# Patient Record
Sex: FEMALE | Race: ASIAN | NOT HISPANIC OR LATINO | URBAN - METROPOLITAN AREA
[De-identification: names, ages, dates, MRNs, and addresses within clinical notes are randomized per-mention and may not be internally consistent; named-entity substitution may affect disease eponyms.]

---

## 2017-11-30 ENCOUNTER — EMERGENCY (EMERGENCY)
Facility: HOSPITAL | Age: 52
LOS: 1 days | Discharge: HOME | End: 2017-11-30
Attending: EMERGENCY MEDICINE | Admitting: EMERGENCY MEDICINE
Payer: MEDICAID

## 2017-11-30 VITALS
HEIGHT: 60 IN | TEMPERATURE: 99 F | HEART RATE: 75 BPM | OXYGEN SATURATION: 97 % | DIASTOLIC BLOOD PRESSURE: 83 MMHG | SYSTOLIC BLOOD PRESSURE: 123 MMHG | WEIGHT: 171.96 LBS | RESPIRATION RATE: 71 BRPM

## 2017-11-30 VITALS
DIASTOLIC BLOOD PRESSURE: 88 MMHG | SYSTOLIC BLOOD PRESSURE: 139 MMHG | OXYGEN SATURATION: 97 % | RESPIRATION RATE: 18 BRPM | HEART RATE: 71 BPM | TEMPERATURE: 99 F

## 2017-11-30 LAB
ALBUMIN SERPL ELPH-MCNC: 4.8 G/DL — SIGNIFICANT CHANGE UP (ref 3.3–5)
ALP SERPL-CCNC: 65 U/L — SIGNIFICANT CHANGE UP (ref 40–120)
ALT FLD-CCNC: 29 U/L RC — SIGNIFICANT CHANGE UP (ref 10–45)
ANION GAP SERPL CALC-SCNC: 15 MMOL/L — SIGNIFICANT CHANGE UP (ref 5–17)
AST SERPL-CCNC: 26 U/L — SIGNIFICANT CHANGE UP (ref 10–40)
BASOPHILS # BLD AUTO: 0.1 K/UL — SIGNIFICANT CHANGE UP (ref 0–0.2)
BASOPHILS NFR BLD AUTO: 0.8 % — SIGNIFICANT CHANGE UP (ref 0–2)
BILIRUB SERPL-MCNC: 0.5 MG/DL — SIGNIFICANT CHANGE UP (ref 0.2–1.2)
BUN SERPL-MCNC: 12 MG/DL — SIGNIFICANT CHANGE UP (ref 7–23)
CALCIUM SERPL-MCNC: 9.7 MG/DL — SIGNIFICANT CHANGE UP (ref 8.4–10.5)
CHLORIDE SERPL-SCNC: 100 MMOL/L — SIGNIFICANT CHANGE UP (ref 96–108)
CO2 SERPL-SCNC: 27 MMOL/L — SIGNIFICANT CHANGE UP (ref 22–31)
CREAT SERPL-MCNC: 0.79 MG/DL — SIGNIFICANT CHANGE UP (ref 0.5–1.3)
EOSINOPHIL # BLD AUTO: 0.3 K/UL — SIGNIFICANT CHANGE UP (ref 0–0.5)
EOSINOPHIL NFR BLD AUTO: 5.4 % — SIGNIFICANT CHANGE UP (ref 0–6)
GAS PNL BLDV: SIGNIFICANT CHANGE UP
GLUCOSE SERPL-MCNC: 106 MG/DL — HIGH (ref 70–99)
HCT VFR BLD CALC: 39.9 % — SIGNIFICANT CHANGE UP (ref 34.5–45)
HGB BLD-MCNC: 13.2 G/DL — SIGNIFICANT CHANGE UP (ref 11.5–15.5)
LYMPHOCYTES # BLD AUTO: 2.1 K/UL — SIGNIFICANT CHANGE UP (ref 1–3.3)
LYMPHOCYTES # BLD AUTO: 34.8 % — SIGNIFICANT CHANGE UP (ref 13–44)
MCHC RBC-ENTMCNC: 29.8 PG — SIGNIFICANT CHANGE UP (ref 27–34)
MCHC RBC-ENTMCNC: 33.2 GM/DL — SIGNIFICANT CHANGE UP (ref 32–36)
MCV RBC AUTO: 89.7 FL — SIGNIFICANT CHANGE UP (ref 80–100)
MONOCYTES # BLD AUTO: 0.6 K/UL — SIGNIFICANT CHANGE UP (ref 0–0.9)
MONOCYTES NFR BLD AUTO: 10 % — SIGNIFICANT CHANGE UP (ref 2–14)
NEUTROPHILS # BLD AUTO: 3 K/UL — SIGNIFICANT CHANGE UP (ref 1.8–7.4)
NEUTROPHILS NFR BLD AUTO: 48.9 % — SIGNIFICANT CHANGE UP (ref 43–77)
PLATELET # BLD AUTO: 248 K/UL — SIGNIFICANT CHANGE UP (ref 150–400)
POTASSIUM SERPL-MCNC: 4 MMOL/L — SIGNIFICANT CHANGE UP (ref 3.5–5.3)
POTASSIUM SERPL-SCNC: 4 MMOL/L — SIGNIFICANT CHANGE UP (ref 3.5–5.3)
PROT SERPL-MCNC: 8.4 G/DL — HIGH (ref 6–8.3)
RBC # BLD: 4.45 M/UL — SIGNIFICANT CHANGE UP (ref 3.8–5.2)
RBC # FLD: 12 % — SIGNIFICANT CHANGE UP (ref 10.3–14.5)
SODIUM SERPL-SCNC: 142 MMOL/L — SIGNIFICANT CHANGE UP (ref 135–145)
WBC # BLD: 6.1 K/UL — SIGNIFICANT CHANGE UP (ref 3.8–10.5)
WBC # FLD AUTO: 6.1 K/UL — SIGNIFICANT CHANGE UP (ref 3.8–10.5)

## 2017-11-30 PROCEDURE — 82435 ASSAY OF BLOOD CHLORIDE: CPT

## 2017-11-30 PROCEDURE — 85027 COMPLETE CBC AUTOMATED: CPT

## 2017-11-30 PROCEDURE — 99284 EMERGENCY DEPT VISIT MOD MDM: CPT | Mod: 25

## 2017-11-30 PROCEDURE — 70491 CT SOFT TISSUE NECK W/DYE: CPT | Mod: 26

## 2017-11-30 PROCEDURE — 82330 ASSAY OF CALCIUM: CPT

## 2017-11-30 PROCEDURE — 80053 COMPREHEN METABOLIC PANEL: CPT

## 2017-11-30 PROCEDURE — 99285 EMERGENCY DEPT VISIT HI MDM: CPT

## 2017-11-30 PROCEDURE — 82947 ASSAY GLUCOSE BLOOD QUANT: CPT

## 2017-11-30 PROCEDURE — 84295 ASSAY OF SERUM SODIUM: CPT

## 2017-11-30 PROCEDURE — 84132 ASSAY OF SERUM POTASSIUM: CPT

## 2017-11-30 PROCEDURE — 85014 HEMATOCRIT: CPT

## 2017-11-30 PROCEDURE — 82803 BLOOD GASES ANY COMBINATION: CPT

## 2017-11-30 PROCEDURE — 70491 CT SOFT TISSUE NECK W/DYE: CPT

## 2017-11-30 PROCEDURE — 96374 THER/PROPH/DIAG INJ IV PUSH: CPT | Mod: XU

## 2017-11-30 PROCEDURE — 83605 ASSAY OF LACTIC ACID: CPT

## 2017-11-30 PROCEDURE — 96375 TX/PRO/DX INJ NEW DRUG ADDON: CPT | Mod: XU

## 2017-11-30 RX ORDER — MORPHINE SULFATE 50 MG/1
4 CAPSULE, EXTENDED RELEASE ORAL ONCE
Qty: 0 | Refills: 0 | Status: DISCONTINUED | OUTPATIENT
Start: 2017-11-30 | End: 2017-11-30

## 2017-11-30 RX ORDER — ACETAMINOPHEN 500 MG
1000 TABLET ORAL ONCE
Qty: 0 | Refills: 0 | Status: COMPLETED | OUTPATIENT
Start: 2017-11-30 | End: 2017-11-30

## 2017-11-30 RX ORDER — SODIUM CHLORIDE 9 MG/ML
1000 INJECTION INTRAMUSCULAR; INTRAVENOUS; SUBCUTANEOUS ONCE
Qty: 0 | Refills: 0 | Status: COMPLETED | OUTPATIENT
Start: 2017-11-30 | End: 2017-11-30

## 2017-11-30 RX ORDER — OXYCODONE HYDROCHLORIDE 5 MG/1
5 TABLET ORAL ONCE
Qty: 0 | Refills: 0 | Status: DISCONTINUED | OUTPATIENT
Start: 2017-11-30 | End: 2017-11-30

## 2017-11-30 RX ADMIN — OXYCODONE HYDROCHLORIDE 5 MILLIGRAM(S): 5 TABLET ORAL at 21:51

## 2017-11-30 RX ADMIN — SODIUM CHLORIDE 1000 MILLILITER(S): 9 INJECTION INTRAMUSCULAR; INTRAVENOUS; SUBCUTANEOUS at 15:52

## 2017-11-30 RX ADMIN — Medication 400 MILLIGRAM(S): at 16:05

## 2017-11-30 RX ADMIN — MORPHINE SULFATE 4 MILLIGRAM(S): 50 CAPSULE, EXTENDED RELEASE ORAL at 17:02

## 2017-11-30 RX ADMIN — MORPHINE SULFATE 4 MILLIGRAM(S): 50 CAPSULE, EXTENDED RELEASE ORAL at 15:52

## 2017-11-30 RX ADMIN — Medication 100 MILLIGRAM(S): at 18:07

## 2017-11-30 RX ADMIN — Medication 1000 MILLIGRAM(S): at 17:02

## 2017-11-30 NOTE — ED PROVIDER NOTE - PROGRESS NOTE DETAILS
Spoke with radiology resident, patient has radioopaque opacity in left mandible, likely retained foreign body vs fracture of medial ledge alveolar process Dr. Shaikh Note: s/o from Dr. Quiñonez pending ct scan which shows possible FB vs fx, dentistry consulted, will dispo based on dentistry findings. Dr. Shaikh Note: pt seen by oral surgeon and dentistry, no infection, no antibx needed, has likely fx with retained bone, recommends outpt with pt's surgeon for re-eval and pain control.  Stable for dc with pain meds.

## 2017-11-30 NOTE — PROGRESS NOTE ADULT - SUBJECTIVE AND OBJECTIVE BOX
Patient is a 52y old female who presents with a chief complaint of tongue pain LL    HPI: pt had lower left third molar surgically extracted two days ago. Pt reports that immediately after anesthesia wore off, she began experiencing severe pain on left lateral tongue. Pt also complaining of soreness and swelling LL posterior to angle of mandible, and cannot open her mouth as normal. Pt was given amoxicillin and Tylenol 3 by oral surgeon, pt currently adherent to Rx regimen. Pt denies fever or difficulty swallowing.     PAST MEDICAL & SURGICAL HISTORY:  Asthma  High cholesterol  HTN (hypertension)  No significant past surgical history    MEDICATIONS  (STANDING):    MEDICATIONS  (PRN):   Tyelnol 3  Amoxicillin    Allergies: Motrin, NSAID's (hives)    *SOCIAL HISTORY: pt presented with daughter    *Last Dental Visit: two days ago    Vital Signs Last 24 Hrs  T(C): 37.2 (30 Nov 2017 21:55), Max: 37.4 (30 Nov 2017 15:19)  T(F): 99 (30 Nov 2017 21:55), Max: 99.4 (30 Nov 2017 15:19)  HR: 71 (30 Nov 2017 21:55) (71 - 75)  BP: 139/88 (30 Nov 2017 21:55) (123/83 - 153/92)  BP(mean): --  RR: 18 (30 Nov 2017 21:55) (18 - 71)  SpO2: 97% (30 Nov 2017 21:55) (97% - 99%)    EOE:  TMJ  ( -  ) clicks                    ( -   ) pops                    ( -   ) crepitus             ( -  ) trismus             ( + ) guarded opening. Pt can open 20 mm with pain, and can be extended to 30 mm with digital manipulation (consistent with recent third molar extraction)             ( -  ) LAD             ( -  ) mild swelling LL near angle of mandible, consistent with recent third molar extraction             ( +  ) asymmetry             ( +  ) palpation            IOE:   permanent/dentition: grossly intact           hard/soft palate:  ( -  ) palatal torus           tongue/FOM: 5x3 mm elliptical white apthous ulcer on left lateral border of tongue. Pain to palpation.           labial/buccal mucosa: WNL           no clinical signs of acute infection at extraction site #17. One silk suture observed.     LABS:                        13.2   6.1   )-----------( 248      ( 30 Nov 2017 15:59 )             39.9     11-30    142  |  100  |  12  ----------------------------<  106<H>  4.0   |  27  |  0.79    Ca    9.7      30 Nov 2017 15:59    TPro  8.4<H>  /  Alb  4.8  /  TBili  0.5  /  DBili  x   /  AST  26  /  ALT  29  /  AlkPhos  65  11-30    WBC Count: 6.1 K/uL [3.8 - 10.5] (11-30 @ 15:59)  Platelet Count - Automated: 248 K/uL [150 - 400] (11-30 @ 15:59)    *DENTAL RADIOGRAPHS: panoramic radiograph taken, unremarkable.     RADIOLOGY & ADDITIONAL STUDIES: Head and neck CT taken. Findings:   Patient is status post extraction of the left mandibular third molar-wisdom  tooth with postsurgical changes including soft tissue and air visualized  within the alveolar cavity. Adjacent to this there is a linear radiopaque  body (series 4, image 162). This may represent a retained foreign body or a  fractured bony fragment originating from the medial ledge of the alveolar  process.    ASSESSMENT: apthous ulcer left lateral border of tongue, possible fractured lingual bony fragment in extraction site #17.      PROCEDURE:  EOE, IOE, radiographs. Explained to pt that swelling and guarded opening are consistent with two days s/p third molar extraction, and that ulcer is most likely apthous and will resolve on its own. Outside oral surgeon should evaluate extraction site for retained bony fragment and possible tx options.      RECOMMENDATIONS:  1) Continue antibiotics regimen as prescribed by oral surgeon.  2) Pain management as per ED attending physician recommendations.  3) Soft food diet, warm salt water rinse. Do not spit forcefully.  4) Dental F/U with outpatient dentist for removal of suture, evaluation of extraction site and tongue, and comprehensive dental care.   5) If any difficulty swallowing/breathing, severe pain or swelling, or fever occur, return to ED.     Nam Sigala DDS #[id number]    OMFS Attending: Dr. Oakley

## 2017-11-30 NOTE — ED PROVIDER NOTE - ATTENDING CONTRIBUTION TO CARE
Ree Quiñonez MD  53yo female with left-sided facial and tongue swelling s/p procedure, likely inflammatory/infectious, will need to obtain ct maxillofacial/ct neck to evaluate for shiela's angina, obtain labs, give analgesia, IV fluids, reassess.

## 2017-11-30 NOTE — ED ADULT NURSE NOTE - OBJECTIVE STATEMENT
52y female arrived to ED complaining of left sided tongue pain. Patient had recent wisdom tooth extraction - bottom left wisdom tooth and complains of tongue swelling and pain x2 days. Patient presents with swelling of the left cheek and endorses chills. Tongue does appear swollen, no redness or bleeding at site. Patient is not drooling, but does endorse difficulty swallowing and SOB. Elevated temperature and mild diaphoresis. Denies chest pain, n/v/d, headache. Patient on amoxicillin s/p extraction. PMHx HTN, asthma, HLD.

## 2017-11-30 NOTE — ED PROVIDER NOTE - MEDICAL DECISION MAKING DETAILS
51yo female with left-sided facial and tongue swelling s/p procedure, likely inflammatory/infectious, will need to obtain ct maxillofacial/ct neck to evaluate for shiela's angina, obtain labs, give analgesia, IV fluids, reassess. Barb Gallegos DO

## 2017-11-30 NOTE — ED PROVIDER NOTE - OBJECTIVE STATEMENT
51yo female PMH asthma, HTN, hyperlipidemia presenting with left facial swelling and left-sided tongue swelling x 2 days s/p left lower molar extraction, no fevers or chills. As per patient's daughter at bedside patient having pain on swallowing and having shortness of breath. No fevers or chills. No chest pain. No wheezing. Patient currently taking amoxicillin s/p procedure.

## 2017-11-30 NOTE — ED PROVIDER NOTE - PHYSICAL EXAMINATION
Gen: NAD, uncomfortable appearing  Head: NCAT  HEENT: Left lower molar sutures in place s/p resection without evidence of fluctuance or erythema, left-sided tongue edema without crepitus, +tender left-sided anterior cervical lymphadenopathy, no crepitus palpated over neck or anterior chest wall  Lung: CTAB, no respiratory distress, no wheezing, rales, rhonchi  CV: normal s1/s2, rrr, no murmurs, Normal perfusion, pulses 2+ throughout  Abd: soft, NTND  MSK: No edema, no visible deformities, full range of motion in all 4 extremities  Neuro: CN II-XII grossly intact, No focal neurologic deficits  Skin: No rash   Psych: normal affect

## 2017-12-01 RX ORDER — OXYCODONE HYDROCHLORIDE 5 MG/1
1 TABLET ORAL
Qty: 10 | Refills: 0
Start: 2017-12-01 | End: 2017-12-03

## 2017-12-01 NOTE — ED POST DISCHARGE NOTE - ADDITIONAL DOCUMENTATION
Patient called regarding prescription not being sent to pharmacy. Rx was left unsubmitted. Reviewed ISTOP reference #[id number]: no prescriptions filled. Will resubmit prescription for 10 tablets as initially prescribed. - Cachorro Cid PA-C Patient called regarding oxycodone prescription not being sent to pharmacy. Rx was left unsubmitted. Discharge summary reviewed. Reviewed ISTOP reference #[id number]: no prescriptions filled. Will resubmit prescription for 10 tablets as initially prescribed. - Cachorro Cid PA-C

## 2018-02-14 NOTE — ED ADULT NURSE NOTE - NS PRO PASSIVE SMOKE EXP
Depression  Depression is one of the most common mental health problems today. It is not just a state of unhappiness or sadness. It is a true disease. The cause seems to be related to a decrease in chemicals that transmit signals in the brain. Having a family history of depression, alcoholism, or suicide increases the risk. Chronic illness, chronic pain, migraine headaches and high emotional stress also increase the risk.  Depression is something we tend to recognize in others, but may have a hard time seeing in ourselves. It can show in many physical and emotional ways:  · Loss of appetite  · Over-eating  · Not being able to sleep  · Sleeping too much  · Tiredness not related to physical exertion  · Restlessness or irritability  · Slowness of movement or speech  · Feeling depressed or withdrawn  · Loss of interest in things you once enjoyed  · Trouble concentrating, poor memory, trouble making decisions  · Thoughts of harming or killing oneself, or thoughts that life is not worth living  · Low self-esteem  The treatment for depression may include both medicine and psychotherapy. Antidepressants can reduce suffering and can improve the ability to function during the depressed period. Therapy can offer emotional support and help you understand emotional factors that may be causing the depression.  Home care  · On-going care and support helps people manage this disease.  Find a healthcare provider and therapist who meet your needs. Seek help when you feel like you may be getting ill.  · Be kind to yourself. Make it a point to do things that you enjoy (gardening, walking in nature, going to a movie, etc.). Reward yourself for small successes.  · Take care of your physical body. Eat a balanced diet (low in saturated fat and high in fruits and vegetables). Exercise at least 3 times a week for 30 minutes. Even mild-moderate exercise (like brisk walking) can make you feel better.  · Avoid alcohol, which can make  depression worse.  · Take medicine as prescribed.  · Tell each of your healthcare providers about all of the prescription drugs, over-the-counter medicines, vitamins, and supplements you take. Certain supplements interact with medicines and can result in dangerous side effects. Ask your pharmacist when you have questions about drug interactions.  · Talk with your family and trusted friends about your feelings and thoughts. Ask them to help you recognize behavior changes early so you can get help and, if needed, medicine can be adjusted.  Follow-up care  Follow up with your healthcare provider, or as advised.  Call 911  Call 911 if you:  · Have suicidal thoughts, a suicide plan, and the means to carry out the plan  · Have trouble breathing  · Are very confused  · Feel very drowsy or have trouble awakening  · Faint or lose consciousness  · Have new chest pain that becomes more severe, lasts longer, or spreads into your shoulder, arm, neck, jaw or back  When to seek medical advice  Call your healthcare provider right away if any of these occur:  · Feeling extreme depression, fear, anxiety, or anger toward yourself or others  · Feeling out of control  · Feeling that you may try to harm yourself or another  · Hearing voices that others do not hear  · Seeing things that others do not see  · Cant sleep or eat for 3 days in a row  · Friends or family express concern over your behavior and ask you to seek help  Date Last Reviewed: 9/29/2015  © 5674-1677 [employer]. [address] [place] [id number]. All rights reserved. This information is not intended as a substitute for professional medical care. Always follow your healthcare professional's instructions.        Migraine Headache  This often severe type of headache is different from other types of headaches in that symptoms other than pain occur with the headache. Nausea and vomiting, lightheadedness, sensitivity to light (photophobia), and other  visual disturbances are common migraine symptoms. The pain may last from a few hours to several days. It is not clear why migraines occur but certain factors called triggers can raise the risk of having a migraine attack. A migraine may be triggered by emotional stress or depression, or by hormone changes during the menstrual cycle. Other triggers include birth control pills, overuse of migraine medicines, alcohol or caffeine, foods with tyramine (such as aged cheese and wine), eyestrain, weather changes, missed meals, or too little or too much sleep.  Home care  Follow these tips when taking care of yourself at home:  · Dont drive yourself home if you were given pain medicine for your headache or are having visual symptoms. Instead, have someone else drive you home. Try to sleep when you get home. You should feel much better when you wake up.  · Cold can help ease migraine symptoms. Put an ice pack on your forehead or at the base of your skull. Put heat on the back of your neck to help ease any neck spasm.  · Drink only clear liquids or eat a light diet until your symptoms get better. This will help you avoid nausea and vomiting.  How to prevent migraines  Pay attention to what seems to trigger your headache. Try to avoid the triggers when you can. If you have frequent headaches, consider keeping a headache diary. In it, write down what you were doing, feeling, or eating in the hours before each headache. Show this to your healthcare provider to help find the cause of your headaches.  If stress seems to be a trigger for your headaches, figure out what is causing stress in your life. Learn new ways to handle your stress. Ideas include regular exercise, biofeedback, self-hypnosis, yoga, and meditation. Talk with your healthcare provider to find out more information about managing stress. Many books and digital media are also available on this subject.  Tyramine is a substance found in many foods. It can trigger a  migraine in some people. These foods contain tyramine:  · Chocolate  · Yogurt  · All cheeses, but especially aged cheeses  · Smoked or pickled fish and meat, including herring, caviar, bologna, pepperoni, and salami  · Liver  · Avocados  · Bananas  · Figs  · Raisins  · Red wine  Try staying away from these foods for 1 to 2 months to see if you have fewer headaches.  How to treat future headaches  · Take time out at the first sign of a headache, if possible. Find a quiet, dark, comfortable place to sit or lie down. Let yourself relax or sleep.  · Put an ice pack on your forehead or on the area of greatest pain. A heating pad and massage may help if you are having a muscle spasm and tightness in your neck.  · If you have been prescribed a medicine to stop a migraine headache, use this at the first warning sign of the headache for best results. First signs may be an aura or pain.  · If you need to take medicine often for your migraine, talk with your healthcare provider about other ways to prevent your headaches.  Follow-up care  Follow up with your healthcare provider, or as advised. Talk with your provider if you have frequent headaches. He or she can figure out a treatment plan. Ask if you can have medicine to take at home the next time you get a bad headache. This may keep you from having to visit the emergency department in the future. You may need to see a headache specialist (neurologist) if you continue to have headaches.  When to seek medical advice  Call your healthcare provider right away if any of these occur:  · Your head pain gets worse, or doesnt get better within 24 hours  · You cant keep liquids down (repeated vomiting)  · Pain in your sinuses, ears, or throat  · Fever of 100.4º F (38º C) or higher, or as directed by your healthcare provider  · Stiff neck  · Extreme drowsiness, confusion, or fainting  · Dizziness, or dizziness with spinning sensation (vertigo)  · Weakness in an arm or leg, or on one  side of your face  · Difficulty talking or seeing  Date Last Reviewed: 8/1/2016  © 7185-9568 [employer]. [address] [place] [address], [place] [address]. All rights reserved. This information is not intended as a substitute for professional medical care. Always follow your healthcare professional's instructions.         No

## 2018-06-08 ENCOUNTER — EMERGENCY (EMERGENCY)
Facility: HOSPITAL | Age: 53
LOS: 1 days | Discharge: HOME | End: 2018-06-08
Attending: EMERGENCY MEDICINE
Payer: MEDICAID

## 2018-06-08 VITALS
TEMPERATURE: 98 F | WEIGHT: 169.98 LBS | DIASTOLIC BLOOD PRESSURE: 83 MMHG | RESPIRATION RATE: 20 BRPM | SYSTOLIC BLOOD PRESSURE: 128 MMHG | HEART RATE: 76 BPM | OXYGEN SATURATION: 100 %

## 2018-06-08 LAB
ALBUMIN SERPL ELPH-MCNC: 4.2 G/DL — SIGNIFICANT CHANGE UP (ref 3.3–5)
ALP SERPL-CCNC: 54 U/L — SIGNIFICANT CHANGE UP (ref 40–120)
ALT FLD-CCNC: 26 U/L — SIGNIFICANT CHANGE UP (ref 10–45)
ANION GAP SERPL CALC-SCNC: 12 MMOL/L — SIGNIFICANT CHANGE UP (ref 5–17)
AST SERPL-CCNC: 18 U/L — SIGNIFICANT CHANGE UP (ref 10–40)
BILIRUB SERPL-MCNC: 0.3 MG/DL — SIGNIFICANT CHANGE UP (ref 0.2–1.2)
BUN SERPL-MCNC: 16 MG/DL — SIGNIFICANT CHANGE UP (ref 7–23)
CALCIUM SERPL-MCNC: 8.9 MG/DL — SIGNIFICANT CHANGE UP (ref 8.4–10.5)
CHLORIDE SERPL-SCNC: 105 MMOL/L — SIGNIFICANT CHANGE UP (ref 96–108)
CO2 SERPL-SCNC: 25 MMOL/L — SIGNIFICANT CHANGE UP (ref 22–31)
CREAT SERPL-MCNC: 0.93 MG/DL — SIGNIFICANT CHANGE UP (ref 0.5–1.3)
ERYTHROCYTE [SEDIMENTATION RATE] IN BLOOD: 7 MM/HR — SIGNIFICANT CHANGE UP (ref 0–20)
GLUCOSE SERPL-MCNC: 170 MG/DL — HIGH (ref 70–99)
HCT VFR BLD CALC: 35.7 % — SIGNIFICANT CHANGE UP (ref 34.5–45)
HGB BLD-MCNC: 12.2 G/DL — SIGNIFICANT CHANGE UP (ref 11.5–15.5)
MCHC RBC-ENTMCNC: 30.6 PG — SIGNIFICANT CHANGE UP (ref 27–34)
MCHC RBC-ENTMCNC: 34.2 GM/DL — SIGNIFICANT CHANGE UP (ref 32–36)
MCV RBC AUTO: 89.5 FL — SIGNIFICANT CHANGE UP (ref 80–100)
PLATELET # BLD AUTO: 253 K/UL — SIGNIFICANT CHANGE UP (ref 150–400)
POTASSIUM SERPL-MCNC: 4.4 MMOL/L — SIGNIFICANT CHANGE UP (ref 3.5–5.3)
POTASSIUM SERPL-SCNC: 4.4 MMOL/L — SIGNIFICANT CHANGE UP (ref 3.5–5.3)
PROT SERPL-MCNC: 7.5 G/DL — SIGNIFICANT CHANGE UP (ref 6–8.3)
RBC # BLD: 3.99 M/UL — SIGNIFICANT CHANGE UP (ref 3.8–5.2)
RBC # FLD: 12.2 % — SIGNIFICANT CHANGE UP (ref 10.3–14.5)
SODIUM SERPL-SCNC: 142 MMOL/L — SIGNIFICANT CHANGE UP (ref 135–145)
WBC # BLD: 6.4 K/UL — SIGNIFICANT CHANGE UP (ref 3.8–10.5)
WBC # FLD AUTO: 6.4 K/UL — SIGNIFICANT CHANGE UP (ref 3.8–10.5)

## 2018-06-08 PROCEDURE — 85652 RBC SED RATE AUTOMATED: CPT

## 2018-06-08 PROCEDURE — 73562 X-RAY EXAM OF KNEE 3: CPT

## 2018-06-08 PROCEDURE — 80053 COMPREHEN METABOLIC PANEL: CPT

## 2018-06-08 PROCEDURE — 99283 EMERGENCY DEPT VISIT LOW MDM: CPT

## 2018-06-08 PROCEDURE — 73562 X-RAY EXAM OF KNEE 3: CPT | Mod: 26,RT

## 2018-06-08 PROCEDURE — 99284 EMERGENCY DEPT VISIT MOD MDM: CPT | Mod: 25

## 2018-06-08 PROCEDURE — 85027 COMPLETE CBC AUTOMATED: CPT

## 2018-06-08 RX ORDER — ACETAMINOPHEN 500 MG
975 TABLET ORAL ONCE
Qty: 0 | Refills: 0 | Status: COMPLETED | OUTPATIENT
Start: 2018-06-08 | End: 2018-06-08

## 2018-06-08 RX ORDER — OXYCODONE HYDROCHLORIDE 5 MG/1
5 TABLET ORAL ONCE
Qty: 0 | Refills: 0 | Status: DISCONTINUED | OUTPATIENT
Start: 2018-06-08 | End: 2018-06-08

## 2018-06-08 RX ADMIN — Medication 975 MILLIGRAM(S): at 06:03

## 2018-06-08 RX ADMIN — OXYCODONE HYDROCHLORIDE 5 MILLIGRAM(S): 5 TABLET ORAL at 04:39

## 2018-06-08 RX ADMIN — Medication 50 MILLIGRAM(S): at 06:03

## 2018-06-08 NOTE — ED PROVIDER NOTE - OBJECTIVE STATEMENT
53yoF pw right knee for 1 day, throbbing, worsening, now 7/10, non radiating, worse with walking, better with rest. no fevers, trauma, weakness, numbness, tingling, nausea, vomiting, diarrhea, chest pain, sob, or other issues. 53yoF pw right knee for 1 day, throbbing, worsening, now 7/10, non radiating, worse with walking, better with rest. no fevers, trauma, weakness, numbness, tingling, nausea, vomiting, diarrhea, chest pain, sob, or other issues.  Has been kneeling for ramadan, notes pain worse when bending and kneeling for prolonged periods of time

## 2018-06-08 NOTE — ED PROVIDER NOTE - PLAN OF CARE
1) Follow up with your doctor in 1 week. Call for an appointment.   2) Return to the ER immediately for new or worsening symptoms including fevers, chills, worsening pain or other issues.   3) Take Prednisone 50mg once a day for the next 4 days, starting tomorrow on 6/7/18.   4) Take Tylenol 975mg every 6 hours as needed for pain.

## 2018-06-08 NOTE — ED ADULT NURSE NOTE - OBJECTIVE STATEMENT
54 yo presents to the ED from home. A&Ox4 c/o right knee pain. pt reports 1 day of throbbing 7/10 pain in R knee. pt denies radiation. pt reports worse with walking, better with rest. pt denies trauma. no deformities noted. pt is well appearing. HTN history. VSS. family at bedside.

## 2018-06-08 NOTE — ED PROVIDER NOTE - PROGRESS NOTE DETAILS
Bud WAGNER - no pain with passive motion. tenderness of the prepatellar bursa. no fevers. likely prepatellar bursitis. will dc on steroids and tylenol.

## 2018-06-08 NOTE — ED PROVIDER NOTE - LOWER EXTREMITY EXAM, RIGHT
TENDERNESS/SWELLING TTP over patella with minimal swelling at pre-patellar bursa; normal range of motion without mechanical block or limitations in range of motion.  No instability.  Soft compartments.  Distal NV exam WNL./TENDERNESS/SWELLING

## 2018-06-08 NOTE — ED PROVIDER NOTE - MEDICAL DECISION MAKING DETAILS
53yoF pw atraumatic right knee pain. likely inflammatory in nature, no fevers. will send labs, xray, and pain meds then reass. 53yoF pw atraumatic right knee pain. likely inflammatory in nature, no fevers. will send labs, xray, and pain meds then reass.  Attending Statement: Agree with the above.  Atraumatic swelling over pre-patellar bursa of L knee without evidence of infection.  TTP.  Inadequate analgesia at home.  Compartments soft, no crepitus, no deformities, no f/c or concern for septic joint.  Analgesia, XR, reassess.  --BMM

## 2018-06-08 NOTE — ED PROVIDER NOTE - CARE PLAN
Principal Discharge DX:	Acute pain of right knee Principal Discharge DX:	Acute pain of right knee  Assessment and plan of treatment:	1) Follow up with your doctor in 1 week. Call for an appointment.   2) Return to the ER immediately for new or worsening symptoms including fevers, chills, worsening pain or other issues.   3) Take Prednisone 50mg once a day for the next 4 days, starting tomorrow on 6/7/18.   4) Take Tylenol 975mg every 6 hours as needed for pain.

## 2018-10-24 ENCOUNTER — EMERGENCY (EMERGENCY)
Facility: HOSPITAL | Age: 53
LOS: 1 days | Discharge: HOME | End: 2018-10-24
Attending: EMERGENCY MEDICINE
Payer: MEDICAID

## 2018-10-24 VITALS
DIASTOLIC BLOOD PRESSURE: 78 MMHG | OXYGEN SATURATION: 98 % | HEART RATE: 73 BPM | RESPIRATION RATE: 16 BRPM | SYSTOLIC BLOOD PRESSURE: 120 MMHG

## 2018-10-24 VITALS
SYSTOLIC BLOOD PRESSURE: 124 MMHG | TEMPERATURE: 98 F | RESPIRATION RATE: 19 BRPM | DIASTOLIC BLOOD PRESSURE: 78 MMHG | HEART RATE: 79 BPM | WEIGHT: 169.98 LBS | HEIGHT: 60 IN | OXYGEN SATURATION: 97 %

## 2018-10-24 LAB
ALBUMIN SERPL ELPH-MCNC: 4.4 G/DL — SIGNIFICANT CHANGE UP (ref 3.3–5)
ALP SERPL-CCNC: 56 U/L — SIGNIFICANT CHANGE UP (ref 40–120)
ALT FLD-CCNC: 36 U/L — SIGNIFICANT CHANGE UP (ref 10–45)
ANION GAP SERPL CALC-SCNC: 12 MMOL/L — SIGNIFICANT CHANGE UP (ref 5–17)
APPEARANCE UR: CLEAR — SIGNIFICANT CHANGE UP
AST SERPL-CCNC: 29 U/L — SIGNIFICANT CHANGE UP (ref 10–40)
BACTERIA # UR AUTO: NEGATIVE — SIGNIFICANT CHANGE UP
BASOPHILS # BLD AUTO: 0.1 K/UL — SIGNIFICANT CHANGE UP (ref 0–0.2)
BASOPHILS NFR BLD AUTO: 1.2 % — SIGNIFICANT CHANGE UP (ref 0–2)
BILIRUB SERPL-MCNC: 0.3 MG/DL — SIGNIFICANT CHANGE UP (ref 0.2–1.2)
BILIRUB UR-MCNC: NEGATIVE — SIGNIFICANT CHANGE UP
BUN SERPL-MCNC: 15 MG/DL — SIGNIFICANT CHANGE UP (ref 7–23)
CALCIUM SERPL-MCNC: 9.1 MG/DL — SIGNIFICANT CHANGE UP (ref 8.4–10.5)
CHLORIDE SERPL-SCNC: 103 MMOL/L — SIGNIFICANT CHANGE UP (ref 96–108)
CO2 SERPL-SCNC: 25 MMOL/L — SIGNIFICANT CHANGE UP (ref 22–31)
COLOR SPEC: COLORLESS — SIGNIFICANT CHANGE UP
CREAT SERPL-MCNC: 0.94 MG/DL — SIGNIFICANT CHANGE UP (ref 0.5–1.3)
DIFF PNL FLD: NEGATIVE — SIGNIFICANT CHANGE UP
EOSINOPHIL # BLD AUTO: 0.4 K/UL — SIGNIFICANT CHANGE UP (ref 0–0.5)
EOSINOPHIL NFR BLD AUTO: 5.8 % — SIGNIFICANT CHANGE UP (ref 0–6)
EPI CELLS # UR: 1 /HPF — SIGNIFICANT CHANGE UP
GAS PNL BLDV: SIGNIFICANT CHANGE UP
GLUCOSE SERPL-MCNC: 185 MG/DL — HIGH (ref 70–99)
GLUCOSE UR QL: NEGATIVE — SIGNIFICANT CHANGE UP
HCT VFR BLD CALC: 37.9 % — SIGNIFICANT CHANGE UP (ref 34.5–45)
HGB BLD-MCNC: 12.7 G/DL — SIGNIFICANT CHANGE UP (ref 11.5–15.5)
HYALINE CASTS # UR AUTO: 0 /LPF — SIGNIFICANT CHANGE UP (ref 0–2)
KETONES UR-MCNC: NEGATIVE — SIGNIFICANT CHANGE UP
LEUKOCYTE ESTERASE UR-ACNC: NEGATIVE — SIGNIFICANT CHANGE UP
LIDOCAIN IGE QN: 37 U/L — SIGNIFICANT CHANGE UP (ref 7–60)
LYMPHOCYTES # BLD AUTO: 2.1 K/UL — SIGNIFICANT CHANGE UP (ref 1–3.3)
LYMPHOCYTES # BLD AUTO: 33.5 % — SIGNIFICANT CHANGE UP (ref 13–44)
MCHC RBC-ENTMCNC: 29.2 PG — SIGNIFICANT CHANGE UP (ref 27–34)
MCHC RBC-ENTMCNC: 33.5 GM/DL — SIGNIFICANT CHANGE UP (ref 32–36)
MCV RBC AUTO: 87.1 FL — SIGNIFICANT CHANGE UP (ref 80–100)
MONOCYTES # BLD AUTO: 0.5 K/UL — SIGNIFICANT CHANGE UP (ref 0–0.9)
MONOCYTES NFR BLD AUTO: 8.4 % — SIGNIFICANT CHANGE UP (ref 2–14)
NEUTROPHILS # BLD AUTO: 3.1 K/UL — SIGNIFICANT CHANGE UP (ref 1.8–7.4)
NEUTROPHILS NFR BLD AUTO: 51.1 % — SIGNIFICANT CHANGE UP (ref 43–77)
NITRITE UR-MCNC: NEGATIVE — SIGNIFICANT CHANGE UP
PH UR: 6.5 — SIGNIFICANT CHANGE UP (ref 5–8)
PLATELET # BLD AUTO: 261 K/UL — SIGNIFICANT CHANGE UP (ref 150–400)
POTASSIUM SERPL-MCNC: 3.8 MMOL/L — SIGNIFICANT CHANGE UP (ref 3.5–5.3)
POTASSIUM SERPL-SCNC: 3.8 MMOL/L — SIGNIFICANT CHANGE UP (ref 3.5–5.3)
PROT SERPL-MCNC: 7.4 G/DL — SIGNIFICANT CHANGE UP (ref 6–8.3)
PROT UR-MCNC: NEGATIVE — SIGNIFICANT CHANGE UP
RBC # BLD: 4.35 M/UL — SIGNIFICANT CHANGE UP (ref 3.8–5.2)
RBC # FLD: 12.3 % — SIGNIFICANT CHANGE UP (ref 10.3–14.5)
RBC CASTS # UR COMP ASSIST: 0 /HPF — SIGNIFICANT CHANGE UP (ref 0–4)
SODIUM SERPL-SCNC: 140 MMOL/L — SIGNIFICANT CHANGE UP (ref 135–145)
SP GR SPEC: 1.01 — LOW (ref 1.01–1.02)
UROBILINOGEN FLD QL: NEGATIVE — SIGNIFICANT CHANGE UP
WBC # BLD: 6.2 K/UL — SIGNIFICANT CHANGE UP (ref 3.8–10.5)
WBC # FLD AUTO: 6.2 K/UL — SIGNIFICANT CHANGE UP (ref 3.8–10.5)
WBC UR QL: 1 /HPF — SIGNIFICANT CHANGE UP (ref 0–5)

## 2018-10-24 PROCEDURE — 84132 ASSAY OF SERUM POTASSIUM: CPT

## 2018-10-24 PROCEDURE — 99284 EMERGENCY DEPT VISIT MOD MDM: CPT | Mod: 25

## 2018-10-24 PROCEDURE — 82435 ASSAY OF BLOOD CHLORIDE: CPT

## 2018-10-24 PROCEDURE — 85014 HEMATOCRIT: CPT

## 2018-10-24 PROCEDURE — 82803 BLOOD GASES ANY COMBINATION: CPT

## 2018-10-24 PROCEDURE — 83605 ASSAY OF LACTIC ACID: CPT

## 2018-10-24 PROCEDURE — 70450 CT HEAD/BRAIN W/O DYE: CPT | Mod: 26

## 2018-10-24 PROCEDURE — 71046 X-RAY EXAM CHEST 2 VIEWS: CPT

## 2018-10-24 PROCEDURE — 71046 X-RAY EXAM CHEST 2 VIEWS: CPT | Mod: 26

## 2018-10-24 PROCEDURE — 85027 COMPLETE CBC AUTOMATED: CPT

## 2018-10-24 PROCEDURE — 96374 THER/PROPH/DIAG INJ IV PUSH: CPT

## 2018-10-24 PROCEDURE — 93010 ELECTROCARDIOGRAM REPORT: CPT

## 2018-10-24 PROCEDURE — 81001 URINALYSIS AUTO W/SCOPE: CPT

## 2018-10-24 PROCEDURE — 82947 ASSAY GLUCOSE BLOOD QUANT: CPT

## 2018-10-24 PROCEDURE — 70450 CT HEAD/BRAIN W/O DYE: CPT

## 2018-10-24 PROCEDURE — 82962 GLUCOSE BLOOD TEST: CPT

## 2018-10-24 PROCEDURE — 93005 ELECTROCARDIOGRAM TRACING: CPT

## 2018-10-24 PROCEDURE — 84295 ASSAY OF SERUM SODIUM: CPT

## 2018-10-24 PROCEDURE — 83036 HEMOGLOBIN GLYCOSYLATED A1C: CPT

## 2018-10-24 PROCEDURE — 74176 CT ABD & PELVIS W/O CONTRAST: CPT | Mod: 26

## 2018-10-24 PROCEDURE — 82330 ASSAY OF CALCIUM: CPT

## 2018-10-24 PROCEDURE — 74176 CT ABD & PELVIS W/O CONTRAST: CPT

## 2018-10-24 PROCEDURE — 83690 ASSAY OF LIPASE: CPT

## 2018-10-24 PROCEDURE — 80053 COMPREHEN METABOLIC PANEL: CPT

## 2018-10-24 RX ORDER — SODIUM CHLORIDE 9 MG/ML
1000 INJECTION INTRAMUSCULAR; INTRAVENOUS; SUBCUTANEOUS ONCE
Qty: 0 | Refills: 0 | Status: COMPLETED | OUTPATIENT
Start: 2018-10-24 | End: 2018-10-24

## 2018-10-24 RX ORDER — ACETAMINOPHEN 500 MG
1000 TABLET ORAL ONCE
Qty: 0 | Refills: 0 | Status: COMPLETED | OUTPATIENT
Start: 2018-10-24 | End: 2018-10-24

## 2018-10-24 RX ORDER — MECLIZINE HCL 12.5 MG
1 TABLET ORAL
Qty: 8 | Refills: 0
Start: 2018-10-24

## 2018-10-24 RX ORDER — MECLIZINE HCL 12.5 MG
25 TABLET ORAL ONCE
Qty: 0 | Refills: 0 | Status: COMPLETED | OUTPATIENT
Start: 2018-10-24 | End: 2018-10-24

## 2018-10-24 RX ADMIN — Medication 400 MILLIGRAM(S): at 17:42

## 2018-10-24 RX ADMIN — SODIUM CHLORIDE 1000 MILLILITER(S): 9 INJECTION INTRAMUSCULAR; INTRAVENOUS; SUBCUTANEOUS at 17:42

## 2018-10-24 RX ADMIN — SODIUM CHLORIDE 1000 MILLILITER(S): 9 INJECTION INTRAMUSCULAR; INTRAVENOUS; SUBCUTANEOUS at 19:19

## 2018-10-24 RX ADMIN — Medication 25 MILLIGRAM(S): at 19:19

## 2018-10-24 NOTE — ED ADULT NURSE NOTE - OBJECTIVE STATEMENT
52 y/o female hx COPD, HTN, pancreatitis presents to the ED from home c/o dizziness this AM, LUQ abd pain, left flank pain. Pt endorsing pain to "lump on her back" that shes had for years. Pt states dizziness has improved, but was worsening with walking. Denies fever, chills, n/v, weakness, diarrhea/constipation, numbness/tingling, urinary s/s. Pt A&Ox3, in no respiratory distress, no CP, abd soft, tender to palpitation in LUQ, nondistended, up thoracic  to palpitation, neuro intact, full ROM, +sensation, +5 strength in all extremities. PT safety maintained with family at bedside, call bell within reach and bed in the lowest position.

## 2018-10-24 NOTE — ED PROVIDER NOTE - PROGRESS NOTE DETAILS
Recived signout Dr Moody 53yf pmh rodrigo/pancreatits,,htn,copd. complains of #1 low back pain for many weeks, 1d hx of abd pain and dizzyness?vertigo. Now improving nausea improved. No fever chills. PE WDWN female awake alert normocephalic atraumatic chest clear anterior & posterior abd soft +bs no mass guarding,cvat, Neruo gcs 15 speech fluent power 5/5 all extr pain ligght touchn intacts.   Plan ivf, check ct head, antivert and reassess  Kenn Deleon MD, Facep Pt reports improvement of dizziness after meclizine as well as improvement of back pain. Stable for d/c home to f/u with PCP, GI, and will give Neurology f/u. - Tg Hampton PA-C Pt reports improvement of dizziness after meclizine as well as improvement of back pain. CT a/p and labs unremarkable. Will obtain xray and if WNL, d/c home to f/u with her PCP, GI, and will give Neurology  and Spine f/u. - SEVEN CarlC Pt reports improvement of dizziness after meclizine as well as improvement of back pain. CT a/p and labs unremarkable except for mildly elevated glucose. A1C sent. D/w patient need to f/u with PCP for repeat labs and follow up for diabetes/prediabetes. Will obtain xray and if WNL, d/c home to f/u with her PCP, GI, and will give Neurology  and Spine f/u. - Tg Hampton PA-C Phone followup, Spoke to Son, pt feels much better, will follow up with PDM elevated A1C and sinus findings with her ENT MD  Kenn Deleon MD, Facep

## 2018-10-24 NOTE — ED PROVIDER NOTE - CARE PLAN
Assessment and plan of treatment:	Stay well hydrated.   Take Tylenol 650mg every 6 hours for pain as needed.   Take meclizine 25mg every 8 hours for dizziness.   Follow up with your Primary Care Provider and Gastroenterologist upon discharge.   Follow up with Neurologist, or [facility] Neurology clinic at [phone] (Call to make appointment).  Be sure to bring copy of printed results with you to your appointments.   Return to ER for any new or worsening symptoms including fever/chills, abdominal pain, vomiting, headache, vision changes, numbness/tingling, weakness, difficulty speaking/swallowing, difficulty walking, or any other concerns. Principal Discharge DX:	Dizziness  Assessment and plan of treatment:	Stay well hydrated.   Take Tylenol 650mg every 6 hours for pain as needed.   Take meclizine 25mg every 8 hours for dizziness.   Follow up with your Primary Care Provider and Gastroenterologist upon discharge.   Follow up with Neurologist, or [facility] Neurology clinic at [phone] (Call to make appointment).  Follow up with the [facility] by calling [phone] for back pain.   Be sure to bring copy of printed results with you to your appointments.   Return to ER for any new or worsening symptoms including fever/chills, abdominal pain, vomiting, headache, vision changes, numbness/tingling, weakness, difficulty speaking/swallowing, difficulty walking, or any other concerns.  Secondary Diagnosis:	Back pain

## 2018-10-24 NOTE — ED PROVIDER NOTE - ATTENDING CONTRIBUTION TO CARE
L flank/paraspinal pain as well as dizziness. dizziness is episodic. No numbness / tingling / urinary incont or retention / bowel probs / ivdu / fevers.  +l paraspinal ttp 5/5 ehl, sensory intact bl le. no abdominal ttp. nl gait, no nystagmus, cn / pronator drift nl.  do not suspect cva. dizziness related to underlying medical prob vs bppv. renal colic vs pyelo vs msk. Symptomatic treatment. re-assess

## 2018-10-24 NOTE — ED ADULT NURSE NOTE - PMH
Asthma    COPD (chronic obstructive pulmonary disease)    High cholesterol    HTN (hypertension)    Pancreatitis

## 2018-10-24 NOTE — ED ADULT NURSE REASSESSMENT NOTE - NS ED NURSE REASSESS COMMENT FT1
Report received from     RODERICK Awad  Pt resting comfortably with family at bedside.   Awaiting relief  Safety maintained at all times, bed in lowest position  Will continue to monitor closely.

## 2018-10-24 NOTE — ED ADULT NURSE NOTE - NSIMPLEMENTINTERV_GEN_ALL_ED
Implemented All Universal Safety Interventions:  [place] to call system. Call bell, personal items and telephone within reach. Instruct patient to call for assistance. Room bathroom lighting operational. Non-slip footwear when patient is off stretcher. Physically safe environment: no spills, clutter or unnecessary equipment. Stretcher in lowest position, wheels locked, appropriate side rails in place.

## 2018-10-24 NOTE — ED PROVIDER NOTE - MUSCULOSKELETAL NECK EXAM
no deformity, pain or tenderness. no restriction of movement/no midline ttp, full ROM intact without pain, no tenderness or lump noted

## 2018-10-24 NOTE — ED PROVIDER NOTE - OBJECTIVE STATEMENT
52yo F with PMH COPD (no home O2), HTN, pancreatitis (last episode 5 years ago) presenting with multiple chief complaints. Pt Turkmen speaking and requesting son at bedside to assist with translation. Pt reports she woke up with room-spinning dizziness, worse when she first got out of bed, improving throughout day. Not worse with head movement. Reports associated nausea, now resolved.  Pt also reports mid back pain x 1 month, dull and worsening, similar to when she had pancreatitis in the past. Pt also reports a "lump" behind her neck for several years, has f/u with doctors and told it is nothing, but reports it is becoming "sensitive," not painful. Pt also notes tingling to left hand for several days. Has not taken anything for pain today. Denies any fever/chills, vision changes, HA, cough, sore throat, CP, SOB, abd pain, vomiting, diarrhea, BRBPR, melena, dysuria, hematuria, unsteady gait, any other numbness/tingling.     PCP Yamilka ([place]) 52yo F with PMH COPD (no home O2), HTN, pancreatitis (last episode 5 years ago) presenting with multiple chief complaints. Pt Yoruba speaking and requesting son at bedside to assist with translation. Pt reports she woke up with room-spinning dizziness, worse when she first got out of bed, improving throughout day. Not worse with head movement. Reports associated nausea, now resolved.  Pt also reports mid back pain x 1 month, dull and worsening, similar to when she had pancreatitis in the past. Pt also reports a "lump" behind her neck for several years, has f/u with doctors and told it is nothing, but reports it is becoming "sensitive," not painful. Pt also notes tingling to left hand for several days. Has not taken anything for pain today. Denies any fever/chills, vision changes, HA, cough, sore throat, CP, SOB, abd pain, vomiting, diarrhea, BRBPR, melena, dysuria, hematuria, unsteady gait, any other numbness/tingling. No h/o vertigo. No recent illness/injury/falls.     PCP Yamilka ([place]) 52yo F with PMH COPD (no home O2), HTN, pancreatitis (last episode 5 years ago) presenting with multiple chief complaints. Pt Kazakh speaking and requesting son at bedside to assist with translation. Pt reports she woke up with room-spinning dizziness, worse when she first got out of bed, improving throughout day. Not worse with head movement. Reports associated nausea, now resolved.  Pt also reports mid back pain x 1 month, dull and worsening, similar to when she had pancreatitis in the past. Pt also reports a "lump" behind her neck for several years, has f/u with doctors and told it is nothing, but reports it is becoming "sensitive," not painful. Pt also notes tingling to left hand for several days. Per son, pt's BP is well controlled, checked this AM also and found to be 120s/80s. Has not taken anything for pain today. Denies any fever/chills, vision changes, HA, cough, sore throat, CP, SOB, abd pain, vomiting, diarrhea, BRBPR, melena, dysuria, hematuria, unsteady gait, any other numbness/tingling. No h/o vertigo. No recent illness/injury/falls.     PCP Yamilka ([place]) 52yo F with PMH COPD (no home O2), HTN, pancreatitis (last episode 5 years ago) presenting with multiple chief complaints. Pt Yoruba speaking and requesting son at bedside to assist with translation. Pt reports she woke up with room-spinning dizziness, worse when she first got out of bed, improving throughout day. Not worse with head movement. Reports associated nausea, now resolved.  Pt also reports mid back pain x 1 month, dull and worsening, similar to when she had pancreatitis in the past. Pt also reports a "lump" behind her neck for several years, has f/u with doctors and told it is nothing, but reports it is becoming "sensitive," not painful. Pt also notes tingling to left hand for several days. Per son, pt's BP is well controlled, checked this AM also and found to be 120s/80s. Has not taken anything for pain today. Denies any fever/chills, vision changes, HA, cough, sore throat, CP, SOB, abd pain, vomiting, diarrhea, BRBPR, melena, dysuria, hematuria, unsteady gait, any other numbness/tingling. No h/o vertigo. No recent illness/injury/falls. Follows with GI, has upcoming scheduled appointment.     PCP Yamilka ([place])

## 2018-10-24 NOTE — ED PROVIDER NOTE - PLAN OF CARE
Stay well hydrated.   Take Tylenol 650mg every 6 hours for pain as needed.   Take meclizine 25mg every 8 hours for dizziness.   Follow up with your Primary Care Provider and Gastroenterologist upon discharge.   Follow up with Neurologist, or [facility] Neurology clinic at [phone] (Call to make appointment).  Be sure to bring copy of printed results with you to your appointments.   Return to ER for any new or worsening symptoms including fever/chills, abdominal pain, vomiting, headache, vision changes, numbness/tingling, weakness, difficulty speaking/swallowing, difficulty walking, or any other concerns. Stay well hydrated.   Take Tylenol 650mg every 6 hours for pain as needed.   Take meclizine 25mg every 8 hours for dizziness.   Follow up with your Primary Care Provider and Gastroenterologist upon discharge.   Follow up with Neurologist, or [facility] Neurology clinic at [phone] (Call to make appointment).  Follow up with the [facility] by calling [phone] for back pain.   Be sure to bring copy of printed results with you to your appointments.   Return to ER for any new or worsening symptoms including fever/chills, abdominal pain, vomiting, headache, vision changes, numbness/tingling, weakness, difficulty speaking/swallowing, difficulty walking, or any other concerns.

## 2018-10-25 PROBLEM — J45.909 UNSPECIFIED ASTHMA, UNCOMPLICATED: Chronic | Status: ACTIVE | Noted: 2017-11-30

## 2018-10-25 PROBLEM — E78.00 PURE HYPERCHOLESTEROLEMIA, UNSPECIFIED: Chronic | Status: ACTIVE | Noted: 2017-11-30

## 2018-10-25 PROBLEM — I10 ESSENTIAL (PRIMARY) HYPERTENSION: Chronic | Status: ACTIVE | Noted: 2017-11-30

## 2018-10-25 NOTE — ED POST DISCHARGE NOTE - DETAILS
Pt nonenglish speaking, offered  services but requested son/hcp to be spoken to, discussed elevated A1c, discussed low carbohydrate/low sugar diet and follow up with PMD. pts dizziness decreased. discussed to return for worsening or concerning symptoms. -SEVEN [place]

## 2019-08-06 ENCOUNTER — EMERGENCY (EMERGENCY)
Facility: HOSPITAL | Age: 54
LOS: 1 days | Discharge: HOME | End: 2019-08-06
Attending: EMERGENCY MEDICINE
Payer: MEDICAID

## 2019-08-06 VITALS
DIASTOLIC BLOOD PRESSURE: 74 MMHG | SYSTOLIC BLOOD PRESSURE: 140 MMHG | OXYGEN SATURATION: 100 % | HEART RATE: 70 BPM | RESPIRATION RATE: 17 BRPM

## 2019-08-06 VITALS
TEMPERATURE: 98 F | OXYGEN SATURATION: 98 % | HEART RATE: 76 BPM | SYSTOLIC BLOOD PRESSURE: 109 MMHG | WEIGHT: 169.98 LBS | DIASTOLIC BLOOD PRESSURE: 76 MMHG | RESPIRATION RATE: 16 BRPM | HEIGHT: 59 IN

## 2019-08-06 LAB
ALBUMIN SERPL ELPH-MCNC: 3.3 G/DL — LOW (ref 3.5–5)
ALP SERPL-CCNC: 74 U/L — SIGNIFICANT CHANGE UP (ref 40–120)
ALT FLD-CCNC: 47 U/L DA — SIGNIFICANT CHANGE UP (ref 10–60)
ANION GAP SERPL CALC-SCNC: 4 MMOL/L — LOW (ref 5–17)
APTT BLD: 30.7 SEC — SIGNIFICANT CHANGE UP (ref 27.5–36.3)
AST SERPL-CCNC: 48 U/L — HIGH (ref 10–40)
BASOPHILS # BLD AUTO: 0.05 K/UL — SIGNIFICANT CHANGE UP (ref 0–0.2)
BASOPHILS NFR BLD AUTO: 0.9 % — SIGNIFICANT CHANGE UP (ref 0–2)
BILIRUB SERPL-MCNC: 0.4 MG/DL — SIGNIFICANT CHANGE UP (ref 0.2–1.2)
BUN SERPL-MCNC: 19 MG/DL — HIGH (ref 7–18)
CALCIUM SERPL-MCNC: 9 MG/DL — SIGNIFICANT CHANGE UP (ref 8.4–10.5)
CHLORIDE SERPL-SCNC: 103 MMOL/L — SIGNIFICANT CHANGE UP (ref 96–108)
CO2 SERPL-SCNC: 32 MMOL/L — HIGH (ref 22–31)
CREAT SERPL-MCNC: 1.01 MG/DL — SIGNIFICANT CHANGE UP (ref 0.5–1.3)
EOSINOPHIL # BLD AUTO: 0.31 K/UL — SIGNIFICANT CHANGE UP (ref 0–0.5)
EOSINOPHIL NFR BLD AUTO: 5.6 % — SIGNIFICANT CHANGE UP (ref 0–6)
GLUCOSE SERPL-MCNC: 161 MG/DL — HIGH (ref 70–99)
HCT VFR BLD CALC: 35.7 % — SIGNIFICANT CHANGE UP (ref 34.5–45)
HGB BLD-MCNC: 11.8 G/DL — SIGNIFICANT CHANGE UP (ref 11.5–15.5)
IMM GRANULOCYTES NFR BLD AUTO: 0.4 % — SIGNIFICANT CHANGE UP (ref 0–1.5)
INR BLD: 0.88 RATIO — SIGNIFICANT CHANGE UP (ref 0.88–1.16)
LIDOCAIN IGE QN: 122 U/L — SIGNIFICANT CHANGE UP (ref 73–393)
LYMPHOCYTES # BLD AUTO: 2.19 K/UL — SIGNIFICANT CHANGE UP (ref 1–3.3)
LYMPHOCYTES # BLD AUTO: 39.4 % — SIGNIFICANT CHANGE UP (ref 13–44)
MCHC RBC-ENTMCNC: 29.4 PG — SIGNIFICANT CHANGE UP (ref 27–34)
MCHC RBC-ENTMCNC: 33.1 GM/DL — SIGNIFICANT CHANGE UP (ref 32–36)
MCV RBC AUTO: 88.8 FL — SIGNIFICANT CHANGE UP (ref 80–100)
MONOCYTES # BLD AUTO: 0.41 K/UL — SIGNIFICANT CHANGE UP (ref 0–0.9)
MONOCYTES NFR BLD AUTO: 7.4 % — SIGNIFICANT CHANGE UP (ref 2–14)
NEUTROPHILS # BLD AUTO: 2.58 K/UL — SIGNIFICANT CHANGE UP (ref 1.8–7.4)
NEUTROPHILS NFR BLD AUTO: 46.3 % — SIGNIFICANT CHANGE UP (ref 43–77)
NRBC # BLD: 0 /100 WBCS — SIGNIFICANT CHANGE UP (ref 0–0)
PLATELET # BLD AUTO: 286 K/UL — SIGNIFICANT CHANGE UP (ref 150–400)
POTASSIUM SERPL-MCNC: 4.9 MMOL/L — SIGNIFICANT CHANGE UP (ref 3.5–5.3)
POTASSIUM SERPL-SCNC: 4.9 MMOL/L — SIGNIFICANT CHANGE UP (ref 3.5–5.3)
PROT SERPL-MCNC: 7.9 G/DL — SIGNIFICANT CHANGE UP (ref 6–8.3)
PROTHROM AB SERPL-ACNC: 9.7 SEC — LOW (ref 10–12.9)
RBC # BLD: 4.02 M/UL — SIGNIFICANT CHANGE UP (ref 3.8–5.2)
RBC # FLD: 13.3 % — SIGNIFICANT CHANGE UP (ref 10.3–14.5)
SODIUM SERPL-SCNC: 139 MMOL/L — SIGNIFICANT CHANGE UP (ref 135–145)
TROPONIN I SERPL-MCNC: <0.015 NG/ML — SIGNIFICANT CHANGE UP (ref 0–0.04)
WBC # BLD: 5.56 K/UL — SIGNIFICANT CHANGE UP (ref 3.8–10.5)
WBC # FLD AUTO: 5.56 K/UL — SIGNIFICANT CHANGE UP (ref 3.8–10.5)

## 2019-08-06 PROCEDURE — 70496 CT ANGIOGRAPHY HEAD: CPT | Mod: 26

## 2019-08-06 PROCEDURE — 70498 CT ANGIOGRAPHY NECK: CPT

## 2019-08-06 PROCEDURE — 70450 CT HEAD/BRAIN W/O DYE: CPT

## 2019-08-06 PROCEDURE — 85610 PROTHROMBIN TIME: CPT

## 2019-08-06 PROCEDURE — 71045 X-RAY EXAM CHEST 1 VIEW: CPT

## 2019-08-06 PROCEDURE — 84484 ASSAY OF TROPONIN QUANT: CPT

## 2019-08-06 PROCEDURE — 85730 THROMBOPLASTIN TIME PARTIAL: CPT

## 2019-08-06 PROCEDURE — 70498 CT ANGIOGRAPHY NECK: CPT | Mod: 26

## 2019-08-06 PROCEDURE — 99284 EMERGENCY DEPT VISIT MOD MDM: CPT | Mod: 25

## 2019-08-06 PROCEDURE — 80053 COMPREHEN METABOLIC PANEL: CPT

## 2019-08-06 PROCEDURE — 70450 CT HEAD/BRAIN W/O DYE: CPT | Mod: 26

## 2019-08-06 PROCEDURE — 36415 COLL VENOUS BLD VENIPUNCTURE: CPT

## 2019-08-06 PROCEDURE — 70496 CT ANGIOGRAPHY HEAD: CPT

## 2019-08-06 PROCEDURE — 83690 ASSAY OF LIPASE: CPT

## 2019-08-06 PROCEDURE — 85027 COMPLETE CBC AUTOMATED: CPT

## 2019-08-06 PROCEDURE — 99284 EMERGENCY DEPT VISIT MOD MDM: CPT

## 2019-08-06 PROCEDURE — 71045 X-RAY EXAM CHEST 1 VIEW: CPT | Mod: 26

## 2019-08-06 PROCEDURE — 93005 ELECTROCARDIOGRAM TRACING: CPT

## 2019-08-06 RX ORDER — SODIUM CHLORIDE 9 MG/ML
1000 INJECTION INTRAMUSCULAR; INTRAVENOUS; SUBCUTANEOUS ONCE
Refills: 0 | Status: COMPLETED | OUTPATIENT
Start: 2019-08-06 | End: 2019-08-06

## 2019-08-06 RX ORDER — DIAZEPAM 5 MG
5 TABLET ORAL ONCE
Refills: 0 | Status: DISCONTINUED | OUTPATIENT
Start: 2019-08-06 | End: 2019-08-06

## 2019-08-06 RX ORDER — OXYCODONE HYDROCHLORIDE 5 MG/1
5 TABLET ORAL ONCE
Refills: 0 | Status: DISCONTINUED | OUTPATIENT
Start: 2019-08-06 | End: 2019-08-06

## 2019-08-06 RX ORDER — ACETAMINOPHEN 500 MG
975 TABLET ORAL ONCE
Refills: 0 | Status: COMPLETED | OUTPATIENT
Start: 2019-08-06 | End: 2019-08-06

## 2019-08-06 RX ADMIN — OXYCODONE HYDROCHLORIDE 5 MILLIGRAM(S): 5 TABLET ORAL at 21:33

## 2019-08-06 RX ADMIN — OXYCODONE HYDROCHLORIDE 5 MILLIGRAM(S): 5 TABLET ORAL at 22:33

## 2019-08-06 RX ADMIN — Medication 975 MILLIGRAM(S): at 19:19

## 2019-08-06 RX ADMIN — Medication 975 MILLIGRAM(S): at 20:19

## 2019-08-06 RX ADMIN — Medication 5 MILLIGRAM(S): at 21:34

## 2019-08-06 RX ADMIN — SODIUM CHLORIDE 1000 MILLILITER(S): 9 INJECTION INTRAMUSCULAR; INTRAVENOUS; SUBCUTANEOUS at 20:30

## 2019-08-06 NOTE — ED PROVIDER NOTE - OBJECTIVE STATEMENT
53 y/o F pt with a significant PMHx of Asthma, COPD, High cholesterol, HTN, Pancreatitis, and no significant PSHx presents with son to the ED with dizziness, neck pain, facial numbness, and back pain. Pt denies any other complaints. NKDA.

## 2019-08-06 NOTE — ED PROVIDER NOTE - NSFOLLOWUPINSTRUCTIONS_ED_ALL_ED_FT
Please follow up with your personal medical doctor in 24-48 hours.   Bring results from today to your visit.  Make an appt with the spine center within a few days. Call [phone] for an appt.   If your symptoms change, get worse or if you have any new symptoms, come to the ER right away.  If you have any questions, call the ER at the phone number on this page.

## 2019-08-06 NOTE — ED ADULT NURSE NOTE - OBJECTIVE STATEMENT
Pt c/o upper back pain and neck pain for years but worsened over the last 3 days. Denies falls/injury to areas. No acute distress noted, denies chest pain, no shortness of breath indicated. safety maintained.

## 2019-08-06 NOTE — ED PROVIDER NOTE - CLINICAL SUMMARY MEDICAL DECISION MAKING FREE TEXT BOX
Not clinically consistent with stroke system. Obtain CT, CTA, chest X-ray, EKG, labs, symptom treatment. Unclear why pt unable to ambulate, visualized by me ambulating with no gait or instability, no nystagmus.

## 2019-08-06 NOTE — ED PROVIDER NOTE - CARE PLAN
Principal Discharge DX:	Acute low back pain without sciatica, unspecified back pain laterality  Secondary Diagnosis:	Alkalemia  Secondary Diagnosis:	Facial twitching

## 2019-08-07 PROBLEM — J44.9 CHRONIC OBSTRUCTIVE PULMONARY DISEASE, UNSPECIFIED: Chronic | Status: ACTIVE | Noted: 2018-10-24

## 2019-09-01 ENCOUNTER — OUTPATIENT (OUTPATIENT)
Dept: OUTPATIENT SERVICES | Facility: HOSPITAL | Age: 54
LOS: 1 days | End: 2019-09-01

## 2019-09-23 DIAGNOSIS — Z71.89 OTHER SPECIFIED COUNSELING: ICD-10-CM

## 2020-04-08 ENCOUNTER — EMERGENCY (EMERGENCY)
Facility: HOSPITAL | Age: 55
LOS: 1 days | Discharge: HOME | End: 2020-04-08
Attending: EMERGENCY MEDICINE
Payer: MEDICAID

## 2020-04-08 VITALS
OXYGEN SATURATION: 96 % | HEART RATE: 106 BPM | WEIGHT: 169.98 LBS | DIASTOLIC BLOOD PRESSURE: 91 MMHG | TEMPERATURE: 100 F | HEIGHT: 59 IN | RESPIRATION RATE: 22 BRPM | SYSTOLIC BLOOD PRESSURE: 135 MMHG

## 2020-04-08 VITALS
RESPIRATION RATE: 22 BRPM | OXYGEN SATURATION: 94 % | DIASTOLIC BLOOD PRESSURE: 83 MMHG | HEART RATE: 98 BPM | SYSTOLIC BLOOD PRESSURE: 138 MMHG

## 2020-04-08 PROCEDURE — 93005 ELECTROCARDIOGRAM TRACING: CPT

## 2020-04-08 PROCEDURE — 93010 ELECTROCARDIOGRAM REPORT: CPT

## 2020-04-08 PROCEDURE — 99284 EMERGENCY DEPT VISIT MOD MDM: CPT

## 2020-04-08 PROCEDURE — 71045 X-RAY EXAM CHEST 1 VIEW: CPT

## 2020-04-08 PROCEDURE — 71045 X-RAY EXAM CHEST 1 VIEW: CPT | Mod: 26

## 2020-04-08 PROCEDURE — 99283 EMERGENCY DEPT VISIT LOW MDM: CPT

## 2020-04-08 RX ORDER — DEXAMETHASONE 0.5 MG/5ML
10 ELIXIR ORAL ONCE
Refills: 0 | Status: COMPLETED | OUTPATIENT
Start: 2020-04-08 | End: 2020-04-08

## 2020-04-08 RX ORDER — ACETAMINOPHEN 500 MG
650 TABLET ORAL ONCE
Refills: 0 | Status: COMPLETED | OUTPATIENT
Start: 2020-04-08 | End: 2020-04-08

## 2020-04-08 RX ADMIN — Medication 10 MILLIGRAM(S): at 20:49

## 2020-04-08 RX ADMIN — Medication 650 MILLIGRAM(S): at 22:17

## 2020-04-08 NOTE — ED PROVIDER NOTE - CLINICAL SUMMARY MEDICAL DECISION MAKING FREE TEXT BOX
55y F w/ PMHx HTN, HLD, COPD (not on home 02), Asthma (on Albuterol and Symbicort) presenting with fever, myalgias, dry cough, sob x4 days. Febrile, otherwise VSS, 02 97% on RA. On PE, no wheezing appreciated, +supraclavicular retractions. Likely COVID-19 vs. asthma exacerbation vs. pneumonia. Ambulatory 02 96% on RA. Will obtain CXR, EKG, give Decadron and reassess. Likely discharge with strict return precautions and isolation discussions.

## 2020-04-08 NOTE — ED ADULT NURSE NOTE - OBJECTIVE STATEMENT
55 y.o F A&Ox3 Setswana speaking,  used by MD, with PMH of HTN, COPD (does not require home O2), asthma, and HLD presents to the ED c.o fevers, chills, nonproductive cough and SOB x4 days. Pt. states she has been having worsening SOB over the last 4 days. Used nebulizer treatment at home with no improvement. Pt. took Tylenol last around 1700. Pt. sating 97% on RA at this time. Pt. ambulated and maintained sat of 96% on RA. Endorses bilateral lower rib pain due to constant cough. Denies ABD pain, n/v/d, syncopal episodes, HA at this time. Denies sick contacts or tobacco use. Pt. placed on CM - slightly tachycardic to the 100s - MD aware at bedside. Safety and comfort provided.

## 2020-04-08 NOTE — ED PROVIDER NOTE - CARE PLAN
Principal Discharge DX:	Suspected 2019 novel coronavirus infection  Secondary Diagnosis:	Shortness of breath

## 2020-04-08 NOTE — ED ADULT TRIAGE NOTE - CHIEF COMPLAINT QUOTE
hx asthma and copd; now has fever, sob and body aches x 3 days; cough; using neb rx; no known covid exposure; tmax 103.2 today and tylenol given

## 2020-04-08 NOTE — ED PROVIDER NOTE - ATTENDING CONTRIBUTION TO CARE
I performed a history and physical exam of the patient and discussed their management with the resident/ACP. I reviewed the resident/ACP's note and agree with the documented findings and plan of care.   55F w/ HTN, HLD, COPD not on home O2, with COVID like symptoms. Has been using at albuterol at home for symptoms, reports that it doesn't really help, but has been able to ambulate and denies dyspnea on exertion. Patient tolerating PO well. PE unremarkable without hypoxia on ambulation and on room air, no retractions when I examined the patient after steroids; agree with rest of exam as above. Impression: COVID, asthma without evidence of exacerbation; appears well; without high risk features, EKG unremarkable; XR, steroids, re-eval

## 2020-04-08 NOTE — ED PROVIDER NOTE - OBJECTIVE STATEMENT
55y F 55y F w/ PMHx HTN, HLD, COPD (not on home 02), Asthma (on Albuterol and Symbicort) presenting with fever, myalgias, dry cough, sob x4 days. Patient denies sob at baseline or cough. States she has been taking Albuterol at home q4h for symptoms, last dose at 5pm, but denies relief of sob. Also controlling fever and myalgias at home with Tylenol, last dose at 5pm (500mg). Denies known sick contacts. Has been tolerating PO at home. Denies headache, cp, n/v/d, dysuria, hematuria, rash. 55y Greek speaking F w/ PMHx HTN, HLD, COPD (not on home 02), Asthma (on Albuterol and Symbicort) presenting with fever, myalgias, dry cough, sob x4 days. Patient denies sob at baseline or cough. States she has been taking Albuterol at home q4h for symptoms, last dose at 5pm, but denies relief of sob. Also controlling fever and myalgias at home with Tylenol, last dose at 5pm (500mg). Denies known sick contacts. Has been tolerating PO at home. Denies headache, cp, n/v/d, dysuria, hematuria, rash.   ID# [id number]

## 2020-04-08 NOTE — ED PROVIDER NOTE - PROGRESS NOTE DETAILS
Discussed CXR findings with pt. 02 sat at 87% on RA. Will give Tylenol for myalgias. Discussed strict return precautions, isolation instructions with patient (son translated). Addressed all pt's questions and concerns at this time. Patient verbalizes understanding, medically stable for discharge home. PMD prescribed pt azithromycin and prednisone, instructed patient to keep taking home medications as directed.   Vidya Blair D.O. (PGY-1) Discussed CXR findings with pt. 02 sat at 94% on RA. Will give Tylenol for myalgias. Discussed strict return precautions, isolation instructions with patient (son translated). Addressed all pt's questions and concerns at this time. Patient verbalizes understanding, medically stable for discharge home. PMD prescribed pt azithromycin and prednisone, instructed patient to keep taking home medications as directed.   Vidya Blair D.O. (PGY-1) Patient re-evaluated. Labs and imaging reviewed. O2 sat 94% on RA, reports feeling better, wants to go home. Strict return precautions and quarantine instructions given.  All labs and imaging results (if any), were reviewed with the patient. Patient understands to follow up with their regular doctor. Patient understands to return to the ED is symptoms worsen or progress. Discharge instructions were given to the patient and discussed with patient. Rx (if any) were electronically sent to patient's preferred pharmacy.

## 2020-04-08 NOTE — ED PROVIDER NOTE - NSFOLLOWUPINSTRUCTIONS_ED_ALL_ED_FT
You were seen and evaluated in the Emergency Department for your viral upper respiratory-like, possibly COVID. As we are not testing patients for COVID that are stable for discharge, you should self-quarantine for presumed COVID. You can call back for your viral panel results by [phone]; please see the attached COVID information packet for management of your symptoms, and more information on the next steps including your self-quarantine measures. Remaining self-quarantined is crucial to limiting the spread of the virus.  You may also refer to the CDC website for more information: https://www.cdc.gov/coronavirus/2019-ncov/if-you-are-sick/steps-when-sick.html.      At this time your clinical evaluation and history do not demonstrate any acute, life-threatening medical conditions warranting emergent treatment. We strongly recommend you set up a follow up with your primary care doctor or with our Internal Medicine clinic (see contact information below).    [facility] Internal Medicine  General Internal Medicine  [address] [place] [address]  Phone: [phone]    Continue to maintain oral hydration with plenty of fluids.  You may take Tylenol (Acetaminophen) every 8 hours with food (following the instructions on the medication insert information sheet for dosing) for fever control.  Do not exceed 3000 mg in 24 hours of acetaminophen (Tylenol).     Should you develop new or worsening symptoms including but not limited to chest pain, shortness of breath, abdominal pain, fevers, vomiting, diarrhea - please return to the ED for immediate evaluation.

## 2020-04-08 NOTE — ED PROVIDER NOTE - PATIENT PORTAL LINK FT
You can access the FollowMyHealth Patient Portal offered by [facility] by registering at the following website: [email or web address]. By joining [employer]’s FollowMyHealth portal, you will also be able to view your health information using other applications (apps) compatible with our system.

## 2020-04-08 NOTE — ED PROVIDER NOTE - PHYSICAL EXAMINATION
Physical Exam:  Gen: NAD, non-toxic appearing, awake alert   Head: NCAT  HEENT: PEERL, normal conjunctiva, oral mucosa moist, no pharyngeal erythema   Neck; No cervical lymphadenopathy   Lung: CTAB, +supraclavicular retractions, no respiratory distress, no wheezes/rhonchi/rales B/L, speaking in full sentences  CV: Rapid rate, RR, no murmurs, rubs or gallops  Abd: soft, NT, ND, no guarding, no rigidity, no rebound tenderness  MSK: no visible deformities, ROM normal in UE/LE   Neuro: No focal sensory or motor deficits  Skin: Warm, well perfused, no rash, no leg swelling  Psych: normal affect, calm  ~Vidya Blair D.O. -Resident

## 2020-04-08 NOTE — ED PROVIDER NOTE - NS ED ROS FT
CONST: + fevers, +myalgias, no chills  EYES: no pain, no vision changes  ENT: no sore throat, no ear pain, no change in hearing  CV: no chest pain, no palpitations, no leg swelling  RESP: + shortness of breath, + cough  ABD: no abdominal pain, no nausea, no vomiting, no diarrhea  : no dysuria, no flank pain, no hematuria  MSK: no back pain, no extremity pain  NEURO: no headache or additional neurologic complaints  HEME: no easy bleeding  SKIN:  no rash

## 2020-04-14 NOTE — ED ADULT NURSE NOTE - CAS EDP DISCH TYPE
- K is 3.4 on admission,   - likely secondary to poor PO intake   - replacing KCl 40 mg x 1  - Continue to monitor electrolytes.  - f/u BMP Home

## 2022-02-23 NOTE — ED PROVIDER NOTE - PSH
Hospitalist Progress Note      PCP: Pablo Espinoza MD    Date of Admission: 2/21/2022    Chief Complaint: perianal pain and dyspnea    Hospital Course: In brief this is a 47 yoM with ESRD and TIIDM presenting with perianal pain concerning for cellulitis and dyspnea likely from volume overload. Subjective: feeling better. Pain better controlled. Dyspnea improving.        Medications:  Reviewed    Infusion Medications    dextrose      sodium chloride       Scheduled Medications    epoetin siddharth-epbx  10,000 Units IntraVENous Q MWF    heparin (porcine)        epoetin siddharth-epbx        cefepime  1,000 mg IntraVENous Q24H    metroNIDAZOLE  500 mg Oral 3 times per day    acetaminophen  650 mg Oral 3 times per day    tiotropium-olodaterol  2 puff Inhalation Daily    aspirin  81 mg Oral Daily    calcium acetate  667 mg Oral TID WC    clopidogrel  75 mg Oral Daily    DULoxetine  60 mg Oral Daily    gabapentin  100 mg Oral TID    insulin glargine  30 Units SubCUTAneous Nightly    isosorbide mononitrate  30 mg Oral Daily    lisinopril  10 mg Oral Daily    metoprolol succinate  50 mg Oral Daily    QUEtiapine  50 mg Oral Nightly    torsemide  100 mg Oral Daily    traZODone  150 mg Oral Nightly    linaclotide  145 mcg Oral QAM AC    pravastatin  40 mg Oral Daily    insulin lispro  0-18 Units SubCUTAneous TID WC    insulin lispro  0-9 Units SubCUTAneous Nightly    sodium chloride flush  5-40 mL IntraVENous 2 times per day    heparin (porcine)  5,000 Units SubCUTAneous 3 times per day    vancomycin (VANCOCIN) intermittent dosing (placeholder)   Other RX Placeholder     PRN Meds: heparin (porcine), oxyCODONE, fentanNYL, oxyCODONE, albuterol sulfate HFA, cyclobenzaprine, ipratropium-albuterol, glucose, glucagon (rDNA), dextrose, sodium chloride flush, sodium chloride, dextrose bolus (hypoglycemia) **OR** dextrose bolus (hypoglycemia)      Intake/Output Summary (Last 24 hours) at 2/23/2022 0427  Last data filed at 2/23/2022 1246  Gross per 24 hour   Intake 10 ml   Output --   Net 10 ml       Physical Exam Performed:    /60   Pulse 99   Temp 97.9 °F (36.6 °C) (Oral)   Resp 16   Ht 5' 9\" (1.753 m)   Wt 260 lb 12.9 oz (118.3 kg)   SpO2 99%   BMI 38.51 kg/m²     General appearance: No apparent distress, appears stated age and cooperative. HEENT: Pupils equal, round, and reactive to light. Conjunctivae/corneas clear. Neck: Supple, with full range of motion. No jugular venous distention. Trachea midline. Respiratory:  Normal respiratory effort. Clear to auscultation, bilaterally without Rales/Wheezes/Rhonchi. On 4L sats 100%. Decreased to 1L. Cardiovascular: Regular rate and rhythm with normal S1/S2 without murmurs, rubs or gallops. Abdomen: Soft, non-tender, non-distended with normal bowel sounds. Musculoskeletal: No clubbing, cyanosis or edema bilaterally. Full range of motion without deformity. Skin: perianal induration with significant ttp and erythema no purulent discharge or opening noticed. Neurologic:  Neurovascularly intact without any focal sensory/motor deficits. Cranial nerves: II-XII intact, grossly non-focal.  Psychiatric: Alert and oriented, thought content appropriate, normal insight  Capillary Refill: Brisk,3 seconds, normal   Peripheral Pulses: +2 palpable, equal bilaterally       Labs:   Recent Labs     02/21/22 1810 02/22/22  0541 02/23/22  0800   WBC 13.1* 16.3* 16.0*   HGB 8.9* 8.0* 7.6*   HCT 26.9* 24.6* 23.5*    272 267     Recent Labs     02/21/22  1810 02/22/22  0541 02/23/22  0800   * 129* 131*   K 4.8 5.0 5.9*   CL 89* 91* 92*   CO2 22 18* 18*   BUN 84* 88* 99*   CREATININE 8.0* 9.0* 11.3*   CALCIUM 8.4 8.4 8.4     Recent Labs     02/21/22  1810 02/22/22  0541 02/23/22  0800   AST 8* 10* 12*   ALT 7* 7* 8*   BILITOT <0.2 0.3 <0.2   ALKPHOS 115 98 125     No results for input(s): INR in the last 72 hours.   Recent Labs     02/21/22  2310 02/22/22  0552 02/22/22  1628   TROPONINI 0.15* 0.17* 0.18*       Urinalysis:      Lab Results   Component Value Date    NITRU Negative 02/22/2022    WBCUA 3-5 02/22/2022    BACTERIA Rare 02/22/2022    RBCUA 0-2 02/22/2022    BLOODU TRACE-INTACT 02/22/2022    SPECGRAV 1.015 02/22/2022    GLUCOSEU 250 02/22/2022       Radiology:  CT PELVIS WO CONTRAST Additional Contrast? None   Final Result   1. Small 14 mm ovoid fluid collection in the subcutaneous fat of the right   perineum unchanged from at least 01/01/2021. Minimal adjacent subcutaneous   fat stranding. This may represent a sebaceous cyst with superimposed   infection not excluded. 2. No soft tissue gas or other drainable fluid collection. 3. No acute osseous abnormality. XR CHEST PORTABLE   Final Result   Vascular congestion. No consolidation. Stable cardiomegaly. Assessment/Plan:    Active Hospital Problems    Diagnosis     Acute on chronic combined systolic and diastolic heart failure ([facility]) [I50.43]      Priority: High    Dyslipidemia [E78.5]      Priority: High    Type 2 diabetes, uncontrolled, with neuropathy ([facility] [E11.40, E11.65]      Priority: High    Essential hypertension [I10]      Priority: Medium    SIRS (systemic inflammatory response syndrome) ([facility]) [R65.10]     [facility] [N18.6]     Obesity (BMI 30-39. 9) [E66.9]      Perianal cellulitis w/ sepsis (SIRS criteira WBC 13.1, )  - CT pelvis revealed: small 14 mm ovoid fluid collection in subcutaneous fat of the right perineum unchanged from atleast 01/01/2021. Minimal adjacent subcutaneous fat stranding. This may represent a sebaceous cyst with superimposed infection. No soft tissue gas or other drainable fluid collection. - continue broad Gram negative, positive and anaerobic coverage for now. Unsure if MRSA coverage needed in light of non purulent presentation but will continue for now.   - pain regimen adjusted: acetaminophen No significant past surgical history scheduled, oxycodone PO for breakthrough pain and IV opioids if PO insufficient.  - general surgery following    Dyspnea without hypoxia- likely volume overload from inadequate iHD. Pulmonary congestion on exam and imaging R>L  - continue iHD with nephrology assistance. - overnight pulse ox study ordered    TIIDM- a1c 9.4 on 1/12/2022  - continue basal and SSI    Essential HTN  -continue metoprolol and lisinopril     HLD  -continue pravastatin    Elevated troponin, 0.15-->0.17 on admission  -no c/o chest pain  -likely 2/2 demand ischemia r/t ESRD  -trend troponin  -tele monitoring    Acute on chronic CHF, stable  -strict I&O  -daily weights  -continue demadex     Chronic normocytic anemia likely from anemia of chronic disease  -no s/s of bleeding at this time     Anxiety depression  -continue home meds  -mood stable at this time    Obesity  With Body mass index is 73.7 kg/m². Complicating assessment and treatment. Placing patient at risk for multiple co-morbidities as well as early death and contributing to the patient's presentation. Counseled on weight loss          DVT Prophylaxis: heparin  Diet: ADULT DIET; Regular; 4 carb choices (60 gm/meal); Low Sodium (2 gm); Low Potassium (Less than 3000 mg/day);  Low Phosphorus (Less than 1000 mg); 1800 ml  Code Status: Full Code    PT/OT Eval Status: not ordered    Dispo - pending clinical course    Rosemarie Tabor MD

## 2022-07-12 ENCOUNTER — APPOINTMENT (OUTPATIENT)
Dept: OTOLARYNGOLOGY | Facility: CLINIC | Age: 57
End: 2022-07-12

## 2022-07-12 VITALS
SYSTOLIC BLOOD PRESSURE: 117 MMHG | HEART RATE: 82 BPM | HEIGHT: 60 IN | WEIGHT: 173 LBS | TEMPERATURE: 97.9 F | BODY MASS INDEX: 33.96 KG/M2 | DIASTOLIC BLOOD PRESSURE: 78 MMHG

## 2022-07-12 DIAGNOSIS — J34.2 DEVIATED NASAL SEPTUM: ICD-10-CM

## 2022-07-12 DIAGNOSIS — J34.3 HYPERTROPHY OF NASAL TURBINATES: ICD-10-CM

## 2022-07-12 DIAGNOSIS — L29.9 PRURITUS, UNSPECIFIED: ICD-10-CM

## 2022-07-12 DIAGNOSIS — R09.81 NASAL CONGESTION: ICD-10-CM

## 2022-07-12 DIAGNOSIS — K21.9 GASTRO-ESOPHAGEAL REFLUX DISEASE W/OUT ESOPHAGITIS: ICD-10-CM

## 2022-07-12 DIAGNOSIS — R49.0 DYSPHONIA: ICD-10-CM

## 2022-07-12 DIAGNOSIS — R68.89 OTHER GENERAL SYMPTOMS AND SIGNS: ICD-10-CM

## 2022-07-12 PROCEDURE — 31231 NASAL ENDOSCOPY DX: CPT

## 2022-07-12 PROCEDURE — 99204 OFFICE O/P NEW MOD 45 MIN: CPT | Mod: 25

## 2022-07-12 RX ORDER — FAMOTIDINE 40 MG/1
40 TABLET, FILM COATED ORAL DAILY
Qty: 30 | Refills: 0 | Status: ACTIVE | COMMUNITY
Start: 2022-07-12 | End: 1900-01-01

## 2022-07-12 RX ORDER — AZELASTINE HYDROCHLORIDE 137 UG/1
0.1 SPRAY, METERED NASAL TWICE DAILY
Qty: 1 | Refills: 2 | Status: ACTIVE | COMMUNITY
Start: 2022-07-12 | End: 1900-01-01

## 2022-07-12 RX ORDER — MOMETASONE FUROATE 1 MG/ML
0.1 SOLUTION TOPICAL
Qty: 1 | Refills: 0 | Status: ACTIVE | COMMUNITY
Start: 2022-07-12 | End: 1900-01-01

## 2022-07-12 RX ORDER — FLUTICASONE PROPIONATE 50 UG/1
50 SPRAY, METERED NASAL DAILY
Qty: 1 | Refills: 4 | Status: ACTIVE | COMMUNITY
Start: 2022-07-12 | End: 1900-01-01

## 2022-07-12 NOTE — HISTORY OF PRESENT ILLNESS
[de-identified] : Ms. NÚÑEZ is a 57 year female here with son pt speaks Wolof refered by PCP Dr Marion for painful discoloration of her tongue (grey/yellow) took ?abx and it resolved, started 5/8/2022 and lasted 2 weeks, possibly fungal.\par pt c/o b/l nasal congestion worse at night using flonase daily R>L for years, +throat clearing, dysphonia and occ laryngitis since 2/2022 when she was in [facility] and felt the cold weather contributed to her throat\par bilateral itching ear uses qtips, denies hearing loss\par takes Protonix qpm\par pts last meal is 4 pm\par s/p thyroid nodule biopsy a month ago\par no dysphagia or dyspnea \par

## 2022-07-12 NOTE — REASON FOR VISIT
[Initial Evaluation] : an initial evaluation for [Family Member] : family member [Hoarseness/Dysphonia] : hoarseness [FreeTextEntry2] : nasal congestion / tongue pain

## 2022-07-12 NOTE — ASSESSMENT
[FreeTextEntry1] : Ms. NÚÑEZ is a 57 year female with nasal congestion R>L despite flonase x months and dysphonia. On exam pt found to have eczematous changes b/l ear canals,  DNS left , bilateral inferior turbinate hypertrophy, evidence of LPRD and a smooth small vocal cord polyp\par \par nasal congestion, failed steroid sprays: offered septo/ bilateral inferior turbinate reduction but she prefers to avoid surgery: \par - continue Flonase daily\par - would add Azelastine nasal spray daily\par \par Raspiness/vocal polyp: \par - not interested in pursuing surgical intervention or voice therapy, not bothered by voice just wanted to check if something more serious was going on \par - d/w pt voice therapy or a referral to Dr. Luis for surgical intervention if polyp remains symptomatic, pt declines and will try medical management \par - continue daily PPI at night and add Pepcid in the am  x 1 month\par \par eczema ear canals: \par - Mometasone oil to affected ears as needed no more than 7 days in a row\par \par - f/u November\par

## 2022-07-12 NOTE — PHYSICAL EXAM
[Nasal Endoscopy Performed] : nasal endoscopy was performed, see procedure section for findings [] : septum deviated to the left [Normal] : mucosa is normal [Midline] : trachea located in midline position [Laryngoscopy Performed] : laryngoscopy was performed, see procedure section for findings [de-identified] : dry flaking skin b/l ear canals [de-identified] : hypertrophy

## 2022-07-12 NOTE — PROCEDURE
[FreeTextEntry6] : reason for exam: anterior rhinoscopy insufficient for symptom evaluation \par \par Fiberoptic nasal endoscopy was performed.  R/b/a of procedure was explained to the patient and they agreed to proceed with procedure.  Nasal cavities were treated with afrin and lidocaine prior to nasal endoscopy to make the patient more comfortable. B/l inferior turbinate hypertrophy, severe with edematous mucosa.  Remainder of exam normal, including b/l middle turbinates, superior turbinates, inferior, middle and superior meati and sphenoethmoidal recess.  No nasal polyps.  Septum deviated left\par \par scope #: 30\par  [de-identified] : reason for laryngoscopy: unable to cooperate with mirror \par \par Fiberoptic laryngoscopy was performed on the patient.  R/b/a was explained to the patient and they agreed to proceed with procedure.  Nasal cavities were treated with lidocaine and afrin prior to laryngoscopy for comfort.  Nasal cavities: clear b/l, Nasopharynx: mild erythema and swelling, Oropharynx/Hypopharynx: + lingual tonsillar hypertrophy no masses or lesions, posterior pharyngeal wall with cobblestoning.  No BOT hypertrophy, vallecula is clear of any masses or cysts, Supraglottis: epiglottis sharp with normal bilateral arytenoids, aryepiglottic folds, ventricles but with + interarytenoid edema consistent with reflux, Glottis: clear, TVCs mobile b/l.  Subglottis clear  No pooling of secretions in the pyriform sinus.  Airway patent\par + vocal cord polyp on the left anterior cord \par Scope #: 30\par \par \par

## 2022-07-12 NOTE — END OF VISIT
[FreeTextEntry3] : I personally saw and examined DESIRAE NÚÑEZ in detail.  I spoke to ROSARIO Baugh regarding the assessment and plan of care. I performed the procedures and relevant physical exam.  I have reviewed the above assessment and plan of care and I agree.  I have made changes to the body of the note wherever necessary and appropriate.\par

## 2022-08-09 ENCOUNTER — APPOINTMENT (OUTPATIENT)
Dept: SURGERY | Facility: CLINIC | Age: 57
End: 2022-08-09

## 2022-08-09 VITALS
BODY MASS INDEX: 33.96 KG/M2 | HEIGHT: 60 IN | DIASTOLIC BLOOD PRESSURE: 74 MMHG | SYSTOLIC BLOOD PRESSURE: 110 MMHG | WEIGHT: 173 LBS

## 2022-08-09 DIAGNOSIS — I10 ESSENTIAL (PRIMARY) HYPERTENSION: ICD-10-CM

## 2022-08-09 DIAGNOSIS — K85.90 ACUTE PANCREATITIS WITHOUT NECROSIS OR INFECTION, UNSPECIFIED: ICD-10-CM

## 2022-08-09 DIAGNOSIS — E11.9 TYPE 2 DIABETES MELLITUS W/OUT COMPLICATIONS: ICD-10-CM

## 2022-08-09 DIAGNOSIS — Z78.9 OTHER SPECIFIED HEALTH STATUS: ICD-10-CM

## 2022-08-09 DIAGNOSIS — E78.5 HYPERLIPIDEMIA, UNSPECIFIED: ICD-10-CM

## 2022-08-09 PROCEDURE — 99204 OFFICE O/P NEW MOD 45 MIN: CPT

## 2022-08-12 ENCOUNTER — RESULT REVIEW (OUTPATIENT)
Age: 57
End: 2022-08-12

## 2022-08-14 PROBLEM — Z78.9 NEVER SMOKED TOBACCO: Status: ACTIVE | Noted: 2022-08-14

## 2022-08-14 PROBLEM — I10 HYPERTENSION: Status: ACTIVE | Noted: 2022-08-14

## 2022-08-14 PROBLEM — Z78.9 NO HISTORY OF ALCOHOL USE: Status: ACTIVE | Noted: 2022-08-14

## 2022-08-14 PROBLEM — E78.5 HYPERLIPIDEMIA: Status: ACTIVE | Noted: 2022-08-14

## 2022-08-14 PROBLEM — E11.9 DM TYPE 2 (DIABETES MELLITUS, TYPE 2): Status: ACTIVE | Noted: 2022-08-14

## 2022-08-14 PROBLEM — K85.90 PANCREATITIS: Status: ACTIVE | Noted: 2022-08-14

## 2022-08-14 RX ORDER — PANTOPRAZOLE SODIUM 20 MG/1
TABLET, DELAYED RELEASE ORAL
Refills: 0 | Status: ACTIVE | COMMUNITY

## 2022-08-14 RX ORDER — PANCRELIPASE 36000; 180000; 114000 [USP'U]/1; [USP'U]/1; [USP'U]/1
CAPSULE, DELAYED RELEASE PELLETS ORAL
Refills: 0 | Status: ACTIVE | COMMUNITY

## 2022-08-17 ENCOUNTER — TRANSCRIPTION ENCOUNTER (OUTPATIENT)
Age: 57
End: 2022-08-17

## 2022-08-29 ENCOUNTER — OUTPATIENT (OUTPATIENT)
Dept: OUTPATIENT SERVICES | Facility: HOSPITAL | Age: 57
LOS: 1 days | End: 2022-08-29

## 2022-08-29 VITALS
HEART RATE: 70 BPM | RESPIRATION RATE: 14 BRPM | TEMPERATURE: 98 F | OXYGEN SATURATION: 98 % | DIASTOLIC BLOOD PRESSURE: 80 MMHG | SYSTOLIC BLOOD PRESSURE: 110 MMHG | HEIGHT: 58 IN | WEIGHT: 171.96 LBS

## 2022-08-29 DIAGNOSIS — E04.1 NONTOXIC SINGLE THYROID NODULE: ICD-10-CM

## 2022-08-29 DIAGNOSIS — I10 ESSENTIAL (PRIMARY) HYPERTENSION: ICD-10-CM

## 2022-08-29 DIAGNOSIS — Z91.89 OTHER SPECIFIED PERSONAL RISK FACTORS, NOT ELSEWHERE CLASSIFIED: ICD-10-CM

## 2022-08-29 DIAGNOSIS — Z90.49 ACQUIRED ABSENCE OF OTHER SPECIFIED PARTS OF DIGESTIVE TRACT: Chronic | ICD-10-CM

## 2022-08-29 DIAGNOSIS — Z98.890 OTHER SPECIFIED POSTPROCEDURAL STATES: Chronic | ICD-10-CM

## 2022-08-29 DIAGNOSIS — E11.9 TYPE 2 DIABETES MELLITUS WITHOUT COMPLICATIONS: ICD-10-CM

## 2022-08-29 DIAGNOSIS — J45.909 UNSPECIFIED ASTHMA, UNCOMPLICATED: ICD-10-CM

## 2022-08-29 LAB
A1C WITH ESTIMATED AVERAGE GLUCOSE RESULT: 6.4 % — HIGH (ref 4–5.6)
ALBUMIN SERPL ELPH-MCNC: 4.6 G/DL — SIGNIFICANT CHANGE UP (ref 3.3–5)
ALP SERPL-CCNC: 63 U/L — SIGNIFICANT CHANGE UP (ref 40–120)
ALT FLD-CCNC: 31 U/L — SIGNIFICANT CHANGE UP (ref 4–33)
ANION GAP SERPL CALC-SCNC: 12 MMOL/L — SIGNIFICANT CHANGE UP (ref 7–14)
AST SERPL-CCNC: 26 U/L — SIGNIFICANT CHANGE UP (ref 4–32)
BILIRUB SERPL-MCNC: 0.6 MG/DL — SIGNIFICANT CHANGE UP (ref 0.2–1.2)
BLD GP AB SCN SERPL QL: NEGATIVE — SIGNIFICANT CHANGE UP
BUN SERPL-MCNC: 15 MG/DL — SIGNIFICANT CHANGE UP (ref 7–23)
CALCIUM SERPL-MCNC: 9.5 MG/DL — SIGNIFICANT CHANGE UP (ref 8.4–10.5)
CHLORIDE SERPL-SCNC: 104 MMOL/L — SIGNIFICANT CHANGE UP (ref 98–107)
CO2 SERPL-SCNC: 25 MMOL/L — SIGNIFICANT CHANGE UP (ref 22–31)
CREAT SERPL-MCNC: 0.76 MG/DL — SIGNIFICANT CHANGE UP (ref 0.5–1.3)
EGFR: 91 ML/MIN/1.73M2 — SIGNIFICANT CHANGE UP
ESTIMATED AVERAGE GLUCOSE: 137 — SIGNIFICANT CHANGE UP
GLUCOSE SERPL-MCNC: 72 MG/DL — SIGNIFICANT CHANGE UP (ref 70–99)
HCT VFR BLD CALC: 37 % — SIGNIFICANT CHANGE UP (ref 34.5–45)
HGB BLD-MCNC: 11.9 G/DL — SIGNIFICANT CHANGE UP (ref 11.5–15.5)
MCHC RBC-ENTMCNC: 28.4 PG — SIGNIFICANT CHANGE UP (ref 27–34)
MCHC RBC-ENTMCNC: 32.2 GM/DL — SIGNIFICANT CHANGE UP (ref 32–36)
MCV RBC AUTO: 88.3 FL — SIGNIFICANT CHANGE UP (ref 80–100)
NRBC # BLD: 0 /100 WBCS — SIGNIFICANT CHANGE UP (ref 0–0)
NRBC # FLD: 0 K/UL — SIGNIFICANT CHANGE UP (ref 0–0)
PLATELET # BLD AUTO: 255 K/UL — SIGNIFICANT CHANGE UP (ref 150–400)
POTASSIUM SERPL-MCNC: 4.6 MMOL/L — SIGNIFICANT CHANGE UP (ref 3.5–5.3)
POTASSIUM SERPL-SCNC: 4.6 MMOL/L — SIGNIFICANT CHANGE UP (ref 3.5–5.3)
PROT SERPL-MCNC: 8.1 G/DL — SIGNIFICANT CHANGE UP (ref 6–8.3)
RBC # BLD: 4.19 M/UL — SIGNIFICANT CHANGE UP (ref 3.8–5.2)
RBC # FLD: 13.6 % — SIGNIFICANT CHANGE UP (ref 10.3–14.5)
RH IG SCN BLD-IMP: POSITIVE — SIGNIFICANT CHANGE UP
SODIUM SERPL-SCNC: 141 MMOL/L — SIGNIFICANT CHANGE UP (ref 135–145)
WBC # BLD: 5.35 K/UL — SIGNIFICANT CHANGE UP (ref 3.8–10.5)
WBC # FLD AUTO: 5.35 K/UL — SIGNIFICANT CHANGE UP (ref 3.8–10.5)

## 2022-08-29 PROCEDURE — 93010 ELECTROCARDIOGRAM REPORT: CPT

## 2022-08-29 RX ORDER — SODIUM CHLORIDE 9 MG/ML
1000 INJECTION, SOLUTION INTRAVENOUS
Refills: 0 | Status: DISCONTINUED | OUTPATIENT
Start: 2022-09-12 | End: 2022-09-13

## 2022-08-29 RX ORDER — SIMVASTATIN 20 MG/1
0 TABLET, FILM COATED ORAL
Qty: 0 | Refills: 0 | DISCHARGE

## 2022-08-29 RX ORDER — LIPASE/PROTEASE/AMYLASE 16-48-48K
0 CAPSULE,DELAYED RELEASE (ENTERIC COATED) ORAL
Qty: 0 | Refills: 0 | DISCHARGE

## 2022-08-29 RX ORDER — AMLODIPINE BESYLATE 2.5 MG/1
0 TABLET ORAL
Qty: 0 | Refills: 0 | DISCHARGE

## 2022-08-29 RX ORDER — PANTOPRAZOLE SODIUM 20 MG/1
0 TABLET, DELAYED RELEASE ORAL
Qty: 0 | Refills: 0 | DISCHARGE

## 2022-08-29 RX ORDER — LIPASE/PROTEASE/AMYLASE 16-48-48K
1 CAPSULE,DELAYED RELEASE (ENTERIC COATED) ORAL
Qty: 0 | Refills: 0 | DISCHARGE

## 2022-08-29 RX ORDER — ALBUTEROL 90 UG/1
0 AEROSOL, METERED ORAL
Qty: 0 | Refills: 0 | DISCHARGE

## 2022-08-29 RX ORDER — CIPROFLOXACIN LACTATE 400MG/40ML
0 VIAL (ML) INTRAVENOUS
Qty: 0 | Refills: 0 | DISCHARGE

## 2022-08-29 RX ORDER — FLUTICASONE PROPIONATE AND SALMETEROL 50; 250 UG/1; UG/1
0 POWDER ORAL; RESPIRATORY (INHALATION)
Qty: 0 | Refills: 0 | DISCHARGE

## 2022-08-29 RX ORDER — MONTELUKAST 4 MG/1
0 TABLET, CHEWABLE ORAL
Qty: 0 | Refills: 0 | DISCHARGE

## 2022-08-29 RX ORDER — GABAPENTIN 400 MG/1
0 CAPSULE ORAL
Qty: 0 | Refills: 0 | DISCHARGE

## 2022-08-29 RX ORDER — AMOXICILLIN 250 MG/5ML
0 SUSPENSION, RECONSTITUTED, ORAL (ML) ORAL
Qty: 0 | Refills: 0 | DISCHARGE

## 2022-08-29 NOTE — H&P PST ADULT - PROBLEM SELECTOR PLAN 1
Patient is tentatively scheduled for Right thyroid lobectomy, possible total thyroidectomy, possible paratracheal node dissection, Limited neck dissection with Dr Sow on 09/12/2022.    Pre-op instructions provided. Pt given verbal and written instructions with teach back on chlorhexidine wash and pepcid. Pt verbalized understanding with return demonstration.    Patient son reported that he already took appointment for covid test through the link given by Dr Day office.   Routine Covid PCR test ordered .Instructions regarding covid PCR test and locations for covid testing site provided. Pt verbalized understanding Patient is tentatively scheduled for Right thyroid lobectomy, possible total thyroidectomy, possible paratracheal node dissection, Limited neck dissection with Dr Sow on 09/12/2022.    Pre-op instructions provided. Pt given verbal and written instructions with teach back on chlorhexidine wash and pepcid. Pt verbalized understanding with return demonstration.    Patient son reported that he already took appointment for covid test through the link given by Dr Day office.   Routine Covid PCR test ordered .Instructions regarding covid PCR test and locations for covid testing site provided. Pt verbalized understanding.    ***Patient has complaints of chronic UE numbness and tinglings- s/p anterior diskectomy in the past, (on Neurontin) moderate ROM of neck present. Will let surgeon/ Dr Sow aware. Patient is tentatively scheduled for Right thyroid lobectomy, possible total thyroidectomy, possible paratracheal node dissection, Limited neck dissection with Dr Sow on 09/12/2022.    Pre-op instructions provided. Pt given verbal and written instructions with teach back on chlorhexidine wash and pepcid. Pt verbalized understanding with return demonstration.    Patient son reported that he already took appointment for covid test through the link given by Dr Day office.   Routine Covid PCR test ordered .Instructions regarding covid PCR test and locations for covid testing site provided. Pt verbalized understanding.    ***Patient has complaints of chronic UE numbness and tinglings- s/p anterior discectomy in the past, (on Neurontin) moderate ROM of neck present. Will let surgeon/ Dr Sow aware. Patient is tentatively scheduled for Right thyroid lobectomy, possible total thyroidectomy, possible paratracheal node dissection, Limited neck dissection with Dr Sow on 09/12/2022.    Pre-op instructions provided. Pt given verbal and written instructions with teach back on chlorhexidine wash and patient's can take own Protonix and Carafate. Pt verbalized understanding with return demonstration.    Patient son reported that he already took appointment for covid test through the link given by Dr Day office.   Routine Covid PCR test ordered .Instructions regarding covid PCR test and locations for covid testing site provided. Pt verbalized understanding.    ***Patient has complaints of chronic UE numbness and tinglings- s/p anterior discectomy in the past, (on Neurontin) moderate ROM of neck present. Will let surgeon/ Dr Sow aware.

## 2022-08-29 NOTE — H&P PST ADULT - PROBLEM SELECTOR PLAN 2
Continue on antihypertensive medication    EKG in chart  Will obtain ECHO results from Dr Morris's office

## 2022-08-29 NOTE — H&P PST ADULT - RESPIRATORY AND THORAX COMMENTS
patient reports that her asthma and COPD well controlled, uses rescue inhaler more than 2 months ago

## 2022-08-29 NOTE — H&P PST ADULT - HISTORY OF PRESENT ILLNESS
57 year old female with PMH of HLD, GERD, Asthma, presents to [facility], with pre op diagnosis of Non toxic multinodular nodule, for pre surgical evaluation prior to scheduled surgery- Right thyroid lobectomy, possible total thyroidectomy, possible paratracheal node dissection, Limited neck dissection with Dr Sow. 57 year old female with PMH of HLD, GERD, Asthma, Chronic pancreatitis( on Creon daily) presents to PST, with pre op diagnosis of Non toxic multinodular nodule, for pre surgical evaluation prior to scheduled surgery- Right thyroid lobectomy, possible total thyroidectomy, possible paratracheal node dissection, Limited neck dissection with Dr Sow.

## 2022-08-29 NOTE — H&P PST ADULT - ASSESSMENT
57 year old female with PMH of HLD, GERD, Asthma, presents to [facility], with pre op diagnosis of Non toxic multinodular nodule, for pre surgical evaluation prior to scheduled surgery- Right thyroid lobectomy, possible total thyroidectomy, possible paratracheal node dissection, Limited neck dissection with Dr Sow.

## 2022-08-29 NOTE — H&P PST ADULT - PROBLEM SELECTOR PLAN 4
Patient instructed to hold Glucophage the morning of procedure. Pt stated understanding.  Check fingerstick DOS

## 2022-08-29 NOTE — H&P PST ADULT - NSICDXPASTMEDICALHX_GEN_ALL_CORE_FT
PAST MEDICAL HISTORY:  Asthma     COPD (chronic obstructive pulmonary disease)     High cholesterol     HTN (hypertension)     Pancreatitis      PAST MEDICAL HISTORY:  Asthma     COPD (chronic obstructive pulmonary disease)     High cholesterol     HTN (hypertension)     Pancreatitis chronic pancreatitis on Creon - follows up with GI every 6 months - last seen 12/2021

## 2022-08-29 NOTE — H&P PST ADULT - NSICDXPASTSURGICALHX_GEN_ALL_CORE_FT
PAST SURGICAL HISTORY:  H/O neck surgery 04/2020    S/P cholecystectomy 2013     PAST SURGICAL HISTORY:  H/O neck surgery 04/2020- anterior diskectomy    S/P cholecystectomy 2013     PAST SURGICAL HISTORY:  H/O neck surgery 04/2020- anterior discectomy    S/P cholecystectomy 2013

## 2022-08-29 NOTE — H&P PST ADULT - PROBLEM SELECTOR PLAN 5
BEBA precautions- PMH of cervical fusion surgery in the past, complaints of occasional neck pain, FROM  Short neck.

## 2022-08-29 NOTE — H&P PST ADULT - SOURCE OF INFORMATION, PROFILE
Chief Complaint   Patient presents with     Colposcopy       
needs [facility] translation/patient

## 2022-08-29 NOTE — H&P PST ADULT - MUSCULOSKELETAL
decreased ROM due to pain/normal gait/strength 5/5 bilateral upper extremities/strength 5/5 bilateral lower extremities details…

## 2022-09-09 LAB — SARS-COV-2 RNA SPEC QL NAA+PROBE: SIGNIFICANT CHANGE UP

## 2022-09-09 NOTE — ASU PATIENT PROFILE, ADULT - NSICDXPASTMEDICALHX_GEN_ALL_CORE_FT
PAST MEDICAL HISTORY:  Asthma     COPD (chronic obstructive pulmonary disease)     High cholesterol     HTN (hypertension)     Pancreatitis chronic pancreatitis on Creon - follows up with GI every 6 months - last seen 12/2021

## 2022-09-09 NOTE — ASU PATIENT PROFILE, ADULT - FALL HARM RISK - UNIVERSAL INTERVENTIONS
Bed in lowest position, wheels locked, appropriate side rails in place/Call bell, personal items and telephone in reach/Instruct patient to call for assistance before getting out of bed or chair/Non-slip footwear when patient is out of bed/[place] to call system/Physically safe environment - no spills, clutter or unnecessary equipment/Purposeful Proactive Rounding/Room/bathroom lighting operational, light cord in reach

## 2022-09-09 NOTE — ASU PATIENT PROFILE, ADULT - NSICDXPASTSURGICALHX_GEN_ALL_CORE_FT
PAST SURGICAL HISTORY:  H/O neck surgery 04/2020- anterior discectomy    S/P cholecystectomy 2013

## 2022-09-11 ENCOUNTER — TRANSCRIPTION ENCOUNTER (OUTPATIENT)
Age: 57
End: 2022-09-11

## 2022-09-12 ENCOUNTER — APPOINTMENT (OUTPATIENT)
Dept: SURGERY | Facility: HOSPITAL | Age: 57
End: 2022-09-12

## 2022-09-12 ENCOUNTER — INPATIENT (INPATIENT)
Facility: HOSPITAL | Age: 57
LOS: 0 days | Discharge: HOME | End: 2022-09-13
Attending: SPECIALIST | Admitting: SPECIALIST

## 2022-09-12 ENCOUNTER — RESULT REVIEW (OUTPATIENT)
Age: 57
End: 2022-09-12

## 2022-09-12 ENCOUNTER — TRANSCRIPTION ENCOUNTER (OUTPATIENT)
Age: 57
End: 2022-09-12

## 2022-09-12 VITALS
WEIGHT: 171.96 LBS | SYSTOLIC BLOOD PRESSURE: 137 MMHG | RESPIRATION RATE: 16 BRPM | HEART RATE: 79 BPM | HEIGHT: 58 IN | OXYGEN SATURATION: 96 % | TEMPERATURE: 99 F | DIASTOLIC BLOOD PRESSURE: 77 MMHG

## 2022-09-12 DIAGNOSIS — Z98.890 OTHER SPECIFIED POSTPROCEDURAL STATES: Chronic | ICD-10-CM

## 2022-09-12 DIAGNOSIS — E04.1 NONTOXIC SINGLE THYROID NODULE: ICD-10-CM

## 2022-09-12 DIAGNOSIS — Z90.49 ACQUIRED ABSENCE OF OTHER SPECIFIED PARTS OF DIGESTIVE TRACT: Chronic | ICD-10-CM

## 2022-09-12 LAB
GLUCOSE BLDC GLUCOMTR-MCNC: 100 MG/DL — HIGH (ref 70–99)
GLUCOSE BLDC GLUCOMTR-MCNC: 104 MG/DL — HIGH (ref 70–99)
GLUCOSE BLDC GLUCOMTR-MCNC: 137 MG/DL — HIGH (ref 70–99)

## 2022-09-12 PROCEDURE — 60252 REMOVAL OF THYROID: CPT

## 2022-09-12 PROCEDURE — 13132 CMPLX RPR F/C/C/M/N/AX/G/H/F: CPT | Mod: 59

## 2022-09-12 PROCEDURE — 88307 TISSUE EXAM BY PATHOLOGIST: CPT | Mod: 26

## 2022-09-12 DEVICE — LIGATING CLIPS WECK HORIZON SMALL-WIDE (RED) 24: Type: IMPLANTABLE DEVICE | Status: FUNCTIONAL

## 2022-09-12 RX ORDER — HYDROMORPHONE HYDROCHLORIDE 2 MG/ML
0.5 INJECTION INTRAMUSCULAR; INTRAVENOUS; SUBCUTANEOUS
Refills: 0 | Status: DISCONTINUED | OUTPATIENT
Start: 2022-09-12 | End: 2022-09-12

## 2022-09-12 RX ORDER — TIOTROPIUM BROMIDE 18 UG/1
1 CAPSULE ORAL; RESPIRATORY (INHALATION) DAILY
Refills: 0 | Status: DISCONTINUED | OUTPATIENT
Start: 2022-09-12 | End: 2022-09-13

## 2022-09-12 RX ORDER — METFORMIN HYDROCHLORIDE 850 MG/1
1 TABLET ORAL
Qty: 0 | Refills: 0 | DISCHARGE

## 2022-09-12 RX ORDER — METFORMIN HYDROCHLORIDE 850 MG/1
500 TABLET ORAL
Refills: 0 | Status: DISCONTINUED | OUTPATIENT
Start: 2022-09-12 | End: 2022-09-13

## 2022-09-12 RX ORDER — ONDANSETRON 8 MG/1
4 TABLET, FILM COATED ORAL ONCE
Refills: 0 | Status: DISCONTINUED | OUTPATIENT
Start: 2022-09-12 | End: 2022-09-12

## 2022-09-12 RX ORDER — PANTOPRAZOLE SODIUM 20 MG/1
1 TABLET, DELAYED RELEASE ORAL
Qty: 0 | Refills: 0 | DISCHARGE

## 2022-09-12 RX ORDER — AMLODIPINE BESYLATE 2.5 MG/1
1 TABLET ORAL
Qty: 0 | Refills: 0 | DISCHARGE

## 2022-09-12 RX ORDER — BACLOFEN 100 %
1 POWDER (GRAM) MISCELLANEOUS
Qty: 0 | Refills: 0 | DISCHARGE

## 2022-09-12 RX ORDER — FLUTICASONE FUROATE, UMECLIDINIUM BROMIDE AND VILANTEROL TRIFENATATE 200; 62.5; 25 UG/1; UG/1; UG/1
1 POWDER RESPIRATORY (INHALATION)
Qty: 0 | Refills: 0 | DISCHARGE

## 2022-09-12 RX ORDER — ACETAMINOPHEN 500 MG
650 TABLET ORAL EVERY 6 HOURS
Refills: 0 | Status: DISCONTINUED | OUTPATIENT
Start: 2022-09-12 | End: 2022-09-13

## 2022-09-12 RX ORDER — AMLODIPINE BESYLATE 2.5 MG/1
5 TABLET ORAL AT BEDTIME
Refills: 0 | Status: DISCONTINUED | OUTPATIENT
Start: 2022-09-12 | End: 2022-09-13

## 2022-09-12 RX ORDER — OXYCODONE HYDROCHLORIDE 5 MG/1
5 TABLET ORAL ONCE
Refills: 0 | Status: DISCONTINUED | OUTPATIENT
Start: 2022-09-12 | End: 2022-09-12

## 2022-09-12 RX ORDER — FENTANYL CITRATE 50 UG/ML
50 INJECTION INTRAVENOUS
Refills: 0 | Status: DISCONTINUED | OUTPATIENT
Start: 2022-09-12 | End: 2022-09-12

## 2022-09-12 RX ORDER — ALBUTEROL 90 UG/1
2 AEROSOL, METERED ORAL
Qty: 0 | Refills: 0 | DISCHARGE

## 2022-09-12 RX ORDER — HYDROMORPHONE HYDROCHLORIDE 2 MG/ML
1 INJECTION INTRAMUSCULAR; INTRAVENOUS; SUBCUTANEOUS
Refills: 0 | Status: DISCONTINUED | OUTPATIENT
Start: 2022-09-12 | End: 2022-09-12

## 2022-09-12 RX ORDER — LIPASE/PROTEASE/AMYLASE 16-48-48K
5 CAPSULE,DELAYED RELEASE (ENTERIC COATED) ORAL
Qty: 0 | Refills: 0 | DISCHARGE

## 2022-09-12 RX ORDER — CALCIUM CARBONATE 500(1250)
2 TABLET ORAL
Qty: 0 | Refills: 0 | DISCHARGE

## 2022-09-12 RX ORDER — OXYCODONE AND ACETAMINOPHEN 5; 325 MG/1; MG/1
1 TABLET ORAL EVERY 4 HOURS
Refills: 0 | Status: DISCONTINUED | OUTPATIENT
Start: 2022-09-12 | End: 2022-09-13

## 2022-09-12 RX ORDER — LIPASE/PROTEASE/AMYLASE 16-48-48K
1 CAPSULE,DELAYED RELEASE (ENTERIC COATED) ORAL
Refills: 0 | Status: DISCONTINUED | OUTPATIENT
Start: 2022-09-12 | End: 2022-09-13

## 2022-09-12 RX ORDER — ATORVASTATIN CALCIUM 80 MG/1
1 TABLET, FILM COATED ORAL
Qty: 0 | Refills: 0 | DISCHARGE

## 2022-09-12 RX ORDER — GABAPENTIN 400 MG/1
1 CAPSULE ORAL
Qty: 0 | Refills: 0 | DISCHARGE

## 2022-09-12 RX ORDER — MONTELUKAST 4 MG/1
10 TABLET, CHEWABLE ORAL DAILY
Refills: 0 | Status: DISCONTINUED | OUTPATIENT
Start: 2022-09-12 | End: 2022-09-13

## 2022-09-12 RX ORDER — MONTELUKAST 4 MG/1
1 TABLET, CHEWABLE ORAL
Qty: 0 | Refills: 0 | DISCHARGE

## 2022-09-12 RX ORDER — OMEGA-3 ACID ETHYL ESTERS 1 G
1 CAPSULE ORAL
Qty: 0 | Refills: 0 | DISCHARGE

## 2022-09-12 RX ORDER — ATORVASTATIN CALCIUM 80 MG/1
20 TABLET, FILM COATED ORAL AT BEDTIME
Refills: 0 | Status: DISCONTINUED | OUTPATIENT
Start: 2022-09-12 | End: 2022-09-13

## 2022-09-12 RX ORDER — PANTOPRAZOLE SODIUM 20 MG/1
40 TABLET, DELAYED RELEASE ORAL
Refills: 0 | Status: DISCONTINUED | OUTPATIENT
Start: 2022-09-12 | End: 2022-09-13

## 2022-09-12 RX ORDER — GABAPENTIN 400 MG/1
300 CAPSULE ORAL THREE TIMES A DAY
Refills: 0 | Status: DISCONTINUED | OUTPATIENT
Start: 2022-09-12 | End: 2022-09-13

## 2022-09-12 RX ORDER — SODIUM CHLORIDE 9 MG/ML
1000 INJECTION, SOLUTION INTRAVENOUS
Refills: 0 | Status: DISCONTINUED | OUTPATIENT
Start: 2022-09-12 | End: 2022-09-12

## 2022-09-12 RX ORDER — ACETAMINOPHEN 500 MG
2 TABLET ORAL
Qty: 0 | Refills: 0 | DISCHARGE
Start: 2022-09-12

## 2022-09-12 RX ORDER — BUDESONIDE AND FORMOTEROL FUMARATE DIHYDRATE 160; 4.5 UG/1; UG/1
2 AEROSOL RESPIRATORY (INHALATION)
Refills: 0 | Status: DISCONTINUED | OUTPATIENT
Start: 2022-09-12 | End: 2022-09-13

## 2022-09-12 RX ORDER — BENZOCAINE AND MENTHOL 5; 1 G/100ML; G/100ML
1 LIQUID ORAL
Refills: 0 | Status: DISCONTINUED | OUTPATIENT
Start: 2022-09-12 | End: 2022-09-13

## 2022-09-12 RX ADMIN — AMLODIPINE BESYLATE 5 MILLIGRAM(S): 2.5 TABLET ORAL at 21:21

## 2022-09-12 RX ADMIN — SODIUM CHLORIDE 75 MILLILITER(S): 9 INJECTION, SOLUTION INTRAVENOUS at 12:43

## 2022-09-12 RX ADMIN — MONTELUKAST 10 MILLIGRAM(S): 4 TABLET, CHEWABLE ORAL at 21:13

## 2022-09-12 RX ADMIN — Medication 1 CAPSULE(S): at 22:00

## 2022-09-12 RX ADMIN — OXYCODONE AND ACETAMINOPHEN 1 TABLET(S): 5; 325 TABLET ORAL at 20:59

## 2022-09-12 RX ADMIN — GABAPENTIN 300 MILLIGRAM(S): 400 CAPSULE ORAL at 21:21

## 2022-09-12 RX ADMIN — BUDESONIDE AND FORMOTEROL FUMARATE DIHYDRATE 2 PUFF(S): 160; 4.5 AEROSOL RESPIRATORY (INHALATION) at 22:47

## 2022-09-12 RX ADMIN — OXYCODONE HYDROCHLORIDE 5 MILLIGRAM(S): 5 TABLET ORAL at 14:46

## 2022-09-12 RX ADMIN — Medication 650 MILLIGRAM(S): at 18:24

## 2022-09-12 RX ADMIN — HYDROMORPHONE HYDROCHLORIDE 0.5 MILLIGRAM(S): 2 INJECTION INTRAMUSCULAR; INTRAVENOUS; SUBCUTANEOUS at 12:30

## 2022-09-12 RX ADMIN — TIOTROPIUM BROMIDE 1 CAPSULE(S): 18 CAPSULE ORAL; RESPIRATORY (INHALATION) at 22:00

## 2022-09-12 RX ADMIN — HYDROMORPHONE HYDROCHLORIDE 0.5 MILLIGRAM(S): 2 INJECTION INTRAMUSCULAR; INTRAVENOUS; SUBCUTANEOUS at 12:16

## 2022-09-12 RX ADMIN — Medication 650 MILLIGRAM(S): at 18:54

## 2022-09-12 RX ADMIN — ATORVASTATIN CALCIUM 20 MILLIGRAM(S): 80 TABLET, FILM COATED ORAL at 21:21

## 2022-09-12 RX ADMIN — OXYCODONE AND ACETAMINOPHEN 1 TABLET(S): 5; 325 TABLET ORAL at 20:29

## 2022-09-12 RX ADMIN — PANTOPRAZOLE SODIUM 40 MILLIGRAM(S): 20 TABLET, DELAYED RELEASE ORAL at 21:21

## 2022-09-12 NOTE — HISTORY OF PRESENT ILLNESS
[de-identified] : thyroid nodule of unknown duration. denies dysphagia, hoarseness, SOB or RT exposure.  sonogram shows 1.6 cm right thyroid nodule. FNA (CBL) AUS, KRAS positive. normal TFTs.   I have reviewed all old and new data and available images.  Additional information was obtained from others present at the time of visit to ensure the completeness of the history\par

## 2022-09-12 NOTE — CONSULT LETTER
[Dear  ___] : Dear  [unfilled], [Consult Letter:] : I had the pleasure of evaluating your patient, [unfilled]. [Please see my note below.] : Please see my note below. [Consult Closing:] : Thank you very much for allowing me to participate in the care of this patient.  If you have any questions, please do not hesitate to contact me. [Sincerely,] : Sincerely, [DrLinh  ___] : Dr. TAN [FreeTextEntry2] : Dr. Claritza Bonds,  Dr. Dick Marion [FreeTextEntry3] : Vineet Sow MD, FACS\par System Director, Endocrine Surgery\par [facility]\par Associate  Professor of Surgery\par [facility] [employer] [facility]

## 2022-09-12 NOTE — BRIEF OPERATIVE NOTE - NSICDXBRIEFPROCEDURE_GEN_ALL_CORE_FT
PROCEDURES:  Thyroid lobectomy, total, right, with isthmusectomy 12-Sep-2022 11:49:05  Jeremie Bone  Excision, lymph node, paratracheal 12-Sep-2022 11:51:02  Jeremie Bone

## 2022-09-12 NOTE — ASSESSMENT
[FreeTextEntry1] : lengthy discussion regarding options for management including active surveillance  vs thyroid lobectomy with possible paratracheal node dissection, with or without frozen section vs total thyroidectomy with possible paratracheal node dissection. risks, benefits and alternatives discussed at length.  I have discussed with the patient the anatomy of the area, the pathophysiology of the disease process and the rationale for surgery.  The attendant risks, possible complications and expected postoperative course have been discussed in detail.  I have given the patient the opportunity to ask questions, and all questions have been answered to the patient's satisfaction, and they wish to proceed with the planned procedure. to be scheduled inpatient at [facility] with recurrent nerve and extremity monitoring\par \par

## 2022-09-12 NOTE — BRIEF OPERATIVE NOTE - OPERATION/FINDINGS
Horizontal incision made on the neck, Blunt dissection used to get to the thyroid tissue. Bipolar used to separate the R thyroid lobe from the surrounding tissue. Any vessels encountered were tied of and ligated with bipolar. After the  thyroid lobe was excised and sent for permanent, a paratracheal  node was also excised in a similar fashion. The patient was on peripheral and recurrent laryngeal nerve monitoring for the entirety of the case with no issues detected.

## 2022-09-12 NOTE — ASU PREOP CHECKLIST - HEART RATE (BEATS/MIN)
Patient Specific Counseling (Will Not Stick From Patient To Patient): His current treatment is doing well and he can continue using them as needed. Detail Level: Zone Detail Level: Simple 79

## 2022-09-12 NOTE — DISCHARGE NOTE PROVIDER - CARE PROVIDER_API CALL
Vineet Sow)  Plastic Surgery  [address] [place] [address] [address]  [place] [address]  Phone: [phone]  Fax: [phone]  Follow Up Time:

## 2022-09-12 NOTE — ASU PREOP CHECKLIST - ALLERGIES REVIEWED
1st attempt - lmtcb
Spoke with pt, states she has been taking care of her father who recently had a stroke  She needs to look at his physical therapy schedule then will call back to schedule an appointment  Informed her we cannot send in a refill until she is seen   Pt understood
done

## 2022-09-12 NOTE — PHYSICAL EXAM
[Laryngoscopy Performed] : laryngoscopy was performed, see procedure section for findings [Midline] : located in midline position [Normal] : orientation to person, place, and time: normal [de-identified] : right anterior lower neck scar, well circumscribed and mobile [de-identified] : 1.5 cm right thyroid nodule, well circumscribed and mobile [de-identified] : indirect  laryngoscopy shows normal vocal cord mobility bilaterally with no lesions noted

## 2022-09-12 NOTE — REASON FOR VISIT
[Initial Consultation] : an initial consultation for [Family Member] : family member [Source: ______] : History obtained from [unfilled] [FreeTextEntry2] : thyroid nodule

## 2022-09-12 NOTE — ASU PREOP CHECKLIST - HEIGHT IN FEET
4 Otolaryngologist Procedure Text (C): After obtaining clear surgical margins the patient was sent to otolaryngology for surgical repair.  The patient understands they will receive post-surgical care and follow-up from the referring physician's office.

## 2022-09-12 NOTE — DISCHARGE NOTE PROVIDER - NSDCMRMEDTOKEN_GEN_ALL_CORE_FT
acetaminophen 325 mg oral tablet: 2 tab(s) orally every 6 hours, As needed, Temp greater or equal to 38.5C (101.3F), Moderate Pain (4 - 6)  Albuterol (Eqv-ProAir HFA) 90 mcg/inh inhalation aerosol: 2 puff(s) inhaled every 6 hours, As Needed  baclofen 5 mg oral tablet: 1 tab(s) orally once a day, As Needed  Caltrate 600 mg oral tablet: 2 tab(s) orally once a day  Creon 12,000 units oral delayed release capsule: 5 cap(s) orally 3 times a day  Fish Oil 1000 mg oral capsule: 1 cap(s) orally once a day/ LD on 09/04/2022  Glucophage 500 mg oral tablet: 1 tab(s) orally 2 times a day/ hold on am dose on 09/12/2022  glucosamine hydrochloride 1500 mg oral tablet: 1 tab(s) orally 3 times a day/ LD on 09/04/2022  Lipitor 20 mg oral tablet: 1 tab(s) orally once a day  Neurontin 300 mg oral capsule: 1 cap(s) orally 3 times a day  Norvasc 5 mg oral tablet: 1 tab(s) orally once a day PM  Protonix 40 mg oral delayed release tablet: 1 tab(s) orally once a day  Singulair 10 mg oral tablet: 1 tab(s) orally once a day  Trelegy Ellipta 200 mcg-62.5 mcg-25 mcg/inh inhalation powder: 1 puff(s) inhaled once a day

## 2022-09-13 ENCOUNTER — TRANSCRIPTION ENCOUNTER (OUTPATIENT)
Age: 57
End: 2022-09-13

## 2022-09-13 VITALS
OXYGEN SATURATION: 89 % | TEMPERATURE: 99 F | DIASTOLIC BLOOD PRESSURE: 73 MMHG | HEART RATE: 71 BPM | SYSTOLIC BLOOD PRESSURE: 112 MMHG | RESPIRATION RATE: 16 BRPM

## 2022-09-13 LAB — GLUCOSE BLDC GLUCOMTR-MCNC: 121 MG/DL — HIGH (ref 70–99)

## 2022-09-13 RX ORDER — LIPASE/PROTEASE/AMYLASE 16-48-48K
5 CAPSULE,DELAYED RELEASE (ENTERIC COATED) ORAL
Refills: 0 | Status: DISCONTINUED | OUTPATIENT
Start: 2022-09-13 | End: 2022-09-13

## 2022-09-13 RX ADMIN — Medication 5 CAPSULE(S): at 13:55

## 2022-09-13 RX ADMIN — PANTOPRAZOLE SODIUM 40 MILLIGRAM(S): 20 TABLET, DELAYED RELEASE ORAL at 07:33

## 2022-09-13 RX ADMIN — BUDESONIDE AND FORMOTEROL FUMARATE DIHYDRATE 2 PUFF(S): 160; 4.5 AEROSOL RESPIRATORY (INHALATION) at 13:54

## 2022-09-13 RX ADMIN — OXYCODONE AND ACETAMINOPHEN 1 TABLET(S): 5; 325 TABLET ORAL at 11:14

## 2022-09-13 RX ADMIN — METFORMIN HYDROCHLORIDE 500 MILLIGRAM(S): 850 TABLET ORAL at 05:22

## 2022-09-13 RX ADMIN — OXYCODONE AND ACETAMINOPHEN 1 TABLET(S): 5; 325 TABLET ORAL at 10:44

## 2022-09-13 RX ADMIN — OXYCODONE AND ACETAMINOPHEN 1 TABLET(S): 5; 325 TABLET ORAL at 04:56

## 2022-09-13 RX ADMIN — SODIUM CHLORIDE 50 MILLILITER(S): 9 INJECTION, SOLUTION INTRAVENOUS at 04:57

## 2022-09-13 RX ADMIN — GABAPENTIN 300 MILLIGRAM(S): 400 CAPSULE ORAL at 05:22

## 2022-09-13 RX ADMIN — TIOTROPIUM BROMIDE 1 CAPSULE(S): 18 CAPSULE ORAL; RESPIRATORY (INHALATION) at 10:45

## 2022-09-13 NOTE — DISCHARGE NOTE NURSING/CASE MANAGEMENT/SOCIAL WORK - PATIENT PORTAL LINK FT
You can access the FollowMyHealth Patient Portal offered by [facility] by registering at the following website: [email or web address]. By joining [employer]’s FollowMyHealth portal, you will also be able to view your health information using other applications (apps) compatible with our system.

## 2022-09-13 NOTE — DISCHARGE NOTE NURSING/CASE MANAGEMENT/SOCIAL WORK - NSDCPEFALRISK_GEN_ALL_CORE
For information on Fall & Injury Prevention, visit: [email or web address] OR  [email or web address] OR  https://www.cdc.gov/steadi/patient.html

## 2022-09-13 NOTE — PROGRESS NOTE ADULT - SUBJECTIVE AND OBJECTIVE BOX
1 day s/p thyroid lobectomy. afebrile. no collections. now taking adequate po. ISIDRA removed. discharge home. f/u in office

## 2022-09-14 PROBLEM — K85.90 ACUTE PANCREATITIS WITHOUT NECROSIS OR INFECTION, UNSPECIFIED: Chronic | Status: ACTIVE | Noted: 2018-10-24

## 2022-09-14 LAB — SURGICAL PATHOLOGY STUDY: SIGNIFICANT CHANGE UP

## 2022-09-15 ENCOUNTER — APPOINTMENT (OUTPATIENT)
Dept: SURGERY | Facility: CLINIC | Age: 57
End: 2022-09-15

## 2022-09-15 PROCEDURE — 99024 POSTOP FOLLOW-UP VISIT: CPT

## 2022-09-15 NOTE — PHYSICAL EXAM
[de-identified] : mild swelling [Midline] : located in midline position [Normal] : orientation to person, place, and time: normal

## 2022-09-15 NOTE — ASSESSMENT
[FreeTextEntry1] : s/p Right thyroid lobectomy\par path discussed\par daily care\par f/u Endo\par f/u 6 weeks

## 2022-09-15 NOTE — HISTORY OF PRESENT ILLNESS
[de-identified] : Pt 3 day s/p Right thyroid lobectomy here with son c/o pain right neck and stiffness since surgery

## 2022-09-22 ENCOUNTER — APPOINTMENT (OUTPATIENT)
Dept: SURGERY | Facility: CLINIC | Age: 57
End: 2022-09-22

## 2022-09-28 ENCOUNTER — NON-APPOINTMENT (OUTPATIENT)
Age: 57
End: 2022-09-28

## 2022-11-10 ENCOUNTER — APPOINTMENT (OUTPATIENT)
Dept: SURGERY | Facility: CLINIC | Age: 57
End: 2022-11-10

## 2022-11-10 PROCEDURE — 99024 POSTOP FOLLOW-UP VISIT: CPT

## 2022-11-10 RX ORDER — BLOOD SUGAR DIAGNOSTIC
STRIP MISCELLANEOUS
Qty: 50 | Refills: 0 | Status: ACTIVE | COMMUNITY
Start: 2022-10-27

## 2022-11-10 RX ORDER — GABAPENTIN 800 MG/1
800 TABLET, COATED ORAL
Qty: 90 | Refills: 0 | Status: ACTIVE | COMMUNITY
Start: 2022-11-08

## 2022-11-10 RX ORDER — HALOBETASOL PROPIONATE 0.5 MG/G
0.05 CREAM TOPICAL
Qty: 50 | Refills: 0 | Status: ACTIVE | COMMUNITY
Start: 2022-10-27

## 2022-11-10 RX ORDER — PANTOPRAZOLE 40 MG/1
40 TABLET, DELAYED RELEASE ORAL
Qty: 30 | Refills: 0 | Status: ACTIVE | COMMUNITY
Start: 2022-10-27

## 2022-11-10 RX ORDER — MONTELUKAST 10 MG/1
10 TABLET, FILM COATED ORAL
Qty: 30 | Refills: 0 | Status: ACTIVE | COMMUNITY
Start: 2022-10-27

## 2022-11-10 RX ORDER — AMLODIPINE BESYLATE 5 MG/1
5 TABLET ORAL
Qty: 30 | Refills: 0 | Status: ACTIVE | COMMUNITY
Start: 2022-10-27

## 2022-11-10 RX ORDER — DULOXETINE HYDROCHLORIDE 20 MG/1
20 CAPSULE, DELAYED RELEASE PELLETS ORAL
Qty: 30 | Refills: 0 | Status: ACTIVE | COMMUNITY
Start: 2022-10-11

## 2022-11-10 RX ORDER — AZELASTINE HYDROCHLORIDE 0.5 MG/ML
0.05 SOLUTION/ DROPS OPHTHALMIC
Qty: 6 | Refills: 0 | Status: ACTIVE | COMMUNITY
Start: 2022-10-27

## 2022-11-10 RX ORDER — FLUTICASONE FUROATE, UMECLIDINIUM BROMIDE AND VILANTEROL TRIFENATATE 200; 62.5; 25 UG/1; UG/1; UG/1
200-62.5-25 POWDER RESPIRATORY (INHALATION)
Qty: 60 | Refills: 0 | Status: ACTIVE | COMMUNITY
Start: 2022-10-18

## 2022-11-10 RX ORDER — ALBUTEROL SULFATE 90 UG/1
108 (90 BASE) INHALANT RESPIRATORY (INHALATION)
Qty: 8 | Refills: 0 | Status: ACTIVE | COMMUNITY
Start: 2022-10-01

## 2022-11-10 RX ORDER — METFORMIN HYDROCHLORIDE 500 MG/1
500 TABLET, COATED ORAL
Qty: 60 | Refills: 0 | Status: ACTIVE | COMMUNITY
Start: 2022-10-27

## 2022-11-10 RX ORDER — ERGOCALCIFEROL 1.25 MG/1
1.25 MG CAPSULE, LIQUID FILLED ORAL
Qty: 1 | Refills: 0 | Status: ACTIVE | COMMUNITY
Start: 2022-10-01

## 2022-11-10 RX ORDER — PEN NEEDLE, DIABETIC 32GX 5/32"
NEEDLE, DISPOSABLE MISCELLANEOUS
Qty: 60 | Refills: 0 | Status: ACTIVE | COMMUNITY
Start: 2022-11-08

## 2022-11-10 RX ORDER — OXYCODONE AND ACETAMINOPHEN 5; 325 MG/1; MG/1
5-325 TABLET ORAL
Qty: 12 | Refills: 0 | Status: COMPLETED | COMMUNITY
Start: 2022-09-15 | End: 2022-11-10

## 2022-11-10 NOTE — PHYSICAL EXAM
[de-identified] : well healed scar [Midline] : located in midline position [Normal] : orientation to person, place, and time: normal

## 2022-11-10 NOTE — ASSESSMENT
[FreeTextEntry1] : s/p Thyroid lobectomy\par daily care\par path discussed\par f/u Dr Bonds\par f/u 6 weeks

## 2023-01-03 ENCOUNTER — APPOINTMENT (OUTPATIENT)
Dept: SURGERY | Facility: CLINIC | Age: 58
End: 2023-01-03
Payer: MEDICAID

## 2023-01-03 PROCEDURE — 99213 OFFICE O/P EST LOW 20 MIN: CPT | Mod: 25

## 2023-01-03 RX ORDER — ROSUVASTATIN CALCIUM 20 MG/1
20 TABLET, FILM COATED ORAL
Qty: 30 | Refills: 0 | Status: ACTIVE | COMMUNITY
Start: 2022-12-29

## 2023-01-03 NOTE — PHYSICAL EXAM
[de-identified] : well healed scar [Midline] : located in midline position [Normal] : orientation to person, place, and time: normal

## 2023-01-03 NOTE — HISTORY OF PRESENT ILLNESS
[de-identified] : Pt 3 months s/p Right thyroid lobectomy c/o increased fatigue Pt has not seen Dr Bonds as of yet all reports reviewed

## 2023-01-03 NOTE — ASSESSMENT
[FreeTextEntry1] : s/p Thyroid lobectomy\par scar management\par blood work\par f/u Dr Bonds\par f/u 4 months

## 2023-01-04 ENCOUNTER — NON-APPOINTMENT (OUTPATIENT)
Age: 58
End: 2023-01-04

## 2023-01-04 DIAGNOSIS — E04.1 NONTOXIC SINGLE THYROID NODULE: ICD-10-CM

## 2023-01-04 DIAGNOSIS — Z00.00 ENCOUNTER FOR GENERAL ADULT MEDICAL EXAMINATION W/OUT ABNORMAL FINDINGS: ICD-10-CM

## 2023-01-04 LAB
T3 SERPL-MCNC: 87 NG/DL
T4 FREE SERPL-MCNC: 0.9 NG/DL
TSH SERPL-ACNC: 22.3 UIU/ML

## 2023-01-04 RX ORDER — LEVOTHYROXINE SODIUM 0.09 MG/1
88 TABLET ORAL DAILY
Qty: 90 | Refills: 0 | Status: ACTIVE | COMMUNITY
Start: 2023-01-04 | End: 1900-01-01

## 2023-01-04 NOTE — ASU PATIENT PROFILE, ADULT - CENTRAL VENOUS CATHETER
PT verbalizes understanding of d/c instruction. Pt denies any c/o at this time. Pt reports relief of any CP and denies SOB. Per wife an appointment for cardiologist was made already. no

## 2023-01-05 ENCOUNTER — NON-APPOINTMENT (OUTPATIENT)
Age: 58
End: 2023-01-05

## 2023-01-05 LAB
THYROGLOB AB SERPL-ACNC: <20 IU/ML
THYROPEROXIDASE AB SERPL IA-ACNC: 194 IU/ML

## 2023-01-05 NOTE — ED ADULT NURSE NOTE - CHIEF COMPLAINT QUOTE
Infectious Disease Consultation    Reason for Consultation: E coli bloodstream infection    Referring provider: Dr. Jay ALONZO  Fidel Alvarez is a 86 year old male admitted initially on 1/4/2023  with fever and rigors.    This is an 85 yo male who presented to the ED via EMS on 1/4 due to fevers and shaking at home.  Patients wife had noted that he seemed confused, was shaking, and had a fever. These were of acute onset the morning of 1/4.  The patient also had a fall at home without acute injury/pain complaints. He denied any associated abdominal pain, N/V/D, dysuria, new cough or shortness of breath.   He has a PMH of essential hypertension, urinary incontinence, hyperlipidemia. Surgical history includes cholecystectomy history.    In the ED his temp was initially 98.1, HR and RR were wnl, and BP was 147/77 with spO2 94% on RA.  The patient weighs 182 lb. His PCT is elevated at 2.01. the UA showed  RBCs but only 1-5 WBCs.  His COVID/RSV/influenza is negative.  There was no leukocytosis or lactate elevation. His CXR was negative for acute CP process per final report.  Later in the ED he did spike a fever of 101.8. blood cultures were drawn and CT A/P was pursued.  The blood cultures are now growing out E coli.  The CT A/P revealed fatty atrophy of the pancreas with pancreatic ductal dilatation new and up to 10 mm with +peripancreatic fluid but no abscess. There was also mild biliary ductal dilatation with CBD up to 16 mm.  That evening 1/4 MRI MRCP was obtained and showed CBD mildly dilated and common hepatic duct dilated at 11 mm with also intrahepatic ductal dilatation. Pancreatic duct was also diffusely dilated. Absent gallbladder. No stone or stricture was seen in biliary ducts.  There was suspected stricture at pancreatic duct at level of pancreatic head.        ROS he is a little hard of hearing, and has some memory issues.  ROS is obtained from the patient with help of family members were present at  dizzy upon waking this morning, L back pain, hx pancreatitis the visit.  Patient states that he has had some epigastric abdominal discomfort but he thought that he was just feeling hungry.  He is not sure how long that has been going on but he thinks for at least 2 days now.  No recent nausea or vomiting.  Denies any recent diarrhea.  He had a formed BM yesterday.  He denies any increased shortness of breath or cough.  He does not recall the events of yesterday.  But according to his wife and daughters the patient had become confused and less responsive and was visibly shaking in the bed in the morning when he awoke.  It took 6 EMS technicians to get him into the ambulance.  He has had no further fevers or chills or shaking since then.  His mental status has improved back to baseline.       Scheduled Medications:  sodium chloride, ,   acetaminophen, 1,000 mg, Once  sodium chloride (PF), 2 mL, 2 times per day  heparin (porcine), 5,000 Units, 3 times per day  Magnesium Standard Replacement Protocol, , See Admin Instructions  Potassium Standard Replacement Protocol (Levels 3.5 and lower), , See Admin Instructions  piperacillin-tazobactam (ZOSYN) extended interval IVPB, 3.375 g, 3 times per day         ALLERGIES:  Patient has no known allergies.      Past Medical History:   Diagnosis Date    Anemia     Arthritis     Chronic pain     lower back    Essential (primary) hypertension     High cholesterol     Stroke (CMS/HCC) 3/25/2022    Trigger finger of right hand 2018    Index and ring finger    Urinary incontinence     Urinary tract infection         Past Surgical History:   Procedure Laterality Date    Cholecystectomy      Colon surgery      Colonoscopy remove lesions by snare  12/18/2001    Hernia repair      Lumbar epidural injection      Removal gallbladder      Shoulder debridement      Tonsillectomy      Tooth extraction  03/05/2018    Trigger finger release Right 03/19/2018    Ring and Index-- Dr Ann    Umbilical hernia repair          Social History     Socioeconomic  History    Marital status: /Civil Union     Spouse name: Elva    Number of children: 1    Years of education: 12+    Highest education level: High school graduate   Occupational History    Occupation:      Comment: retired   Tobacco Use    Smoking status: Never    Smokeless tobacco: Never   Vaping Use    Vaping Use: never used   Substance and Sexual Activity    Alcohol use: Yes     Alcohol/week: 1.7 standard drinks     Types: 2 Standard drinks or equivalent per week    Drug use: No    Sexual activity: Yes     Partners: Female   Other Topics Concern    Not on file   Social History Narrative    Elva (Wife)     Social Determinants of Health     Financial Resource Strain: Low Risk     Social Determinants: Financial Resource Strain: None   Food Insecurity: No Food Insecurity    Social Determinants: Food Insecurity: Never   Transportation Needs: No Transportation Needs    Lack of Transportation (Medical): No    Lack of Transportation (Non-Medical): No   Physical Activity: Not on file   Stress: Not on file   Social Connections: Socially Integrated    Social Determinants: Social Connections: 5 or more times a week   Intimate Partner Violence: Not At Risk    Social Determinants: Intimate Partner Violence Past Fear: No    Social Determinants: Intimate Partner Violence Current Fear: No        Family History   Problem Relation Age of Onset    Other Mother 92        old age    Heart disease Father 59    * Sister     Other Daughter         MVA           Visit Vitals  /73 (BP Location: RUE - Right upper extremity, Patient Position: Semi-Cuellar's)   Pulse 83   Temp 98.9 °F (37.2 °C) (Oral)   Resp 20   Ht 5' 10\" (1.778 m)   Wt 85 kg (187 lb 6.3 oz)   SpO2 92%   BMI 26.89 kg/m²       Temp:  [98.2 °F (36.8 °C)-100.9 °F (38.3 °C)] 98.9 °F (37.2 °C)  Heart Rate:  [] 83  Resp:  [14-20] 20  BP: (130-161)/(68-76) 130/73      Intake/Output Summary (Last 24 hours) at 1/5/2023 1235  Last data filed at  1/5/2023 0600  Gross per 24 hour   Intake 2990 ml   Output --   Net 2990 ml       Gen: NAD Well nourished. Hydrated. Alert.  Psych: Pleasant. Cooperative. Normal affect and mood.  HEENT: NC/AT. Pupils equal. Conjunctive unremarkable. External ears/nose without abnormalities. Moist oral mucosa.  Neck: Supple, no LAD.  CV: RRR. No murmurs. No JVD.  Resp: CTA bilaterally. Good effort. Non labored respirations. No wheezing/rhonchi/rales appreciated.   Antonio: soft. Non-distended. He does endorse tenderness to palpation at the mid epigastric region, with grimace there on exam, no other tender areas.  Active bowel sounds. . No masses appreciated. No organomegaly.   Ext: No edema noted. Not cyanotic.   MSK: Muscle tone and strength equal.  Skin: no rashes noted.  Neuro: A/O x 3. No gross neuro deficits.   Lines: no inf to PIV      Recent Labs   Lab 01/05/23  0520 01/04/23  0903   WBC 13.4* 10.0   RBC 3.00* 3.41*   HGB 9.9* 11.4*   HCT 28.2* 32.2*   MCV 94.0 94.4   MCH 33.0 33.4   MCHC 35.1 35.4    193       Recent Labs   Lab 01/05/23  0520 01/04/23  0914 01/04/23  0903   CALCIUM 8.6  --  9.2   BUN 24*  --  21*   ALBUMIN 2.6*  --  3.4*   AST 26  --  31   CO2 24  --  24   CREATININE 1.10 0.80 0.80   ANIONGAP 10  --  11       Recent Labs   Lab 01/04/23  0903   INR 1.1         Micro:   1/4/23 BC 1/2 E coli in 2 bottles of one set after 11-15 hrs. Sensitivity is in process. The verigene does not show any resistance markers       Gram Stain (no units)   Date Value   01/04/2023 Gram negative bacilli. (AA)      No results found for: CULT     Imaging:   MRI Mrcp  Narrative: MRI abdomen without contrast, MRCP January 4, 2023.    History: RUQ abdominal pain, US nondiagnostic, Biliary dilation &  pancreatitis on CT with fevers - assess for  cholangitis/choledocholithiasis.    Comparison: CT same day    Technique: Multiplanar multisequence MR imaging of the abdomen was  performed without intravenous contrast. Thin section MRCP  images were also  acquired. 3-dimensional reconstructions and maximum intensity projection  (MIP) reformats were then performed on a separate workstation and reviewed  by the radiologist.    Findings:  MRCP:   Multiple metallic surgical clips in the tim hepatis resulting in artifact  and slightly degraded images. Common bile duct is mildly dilated and  irregular measuring up to 8 mm. Common hepatic duct measures up to 11 mm.  Moderate intrahepatic biliary ductal dilatation. No definite  choledocholithiasis or focal stricture.     Gallbladder is absent.    Pancreatic duct is diffusely dilated in the body/tail with dilated,  irregular sidebranches. Duct in the pancreatic head is poorly visualized.    Abdomen:  No hepatic signal loss on out of phase images. Several simple hepatic  cysts. Contour the unenhanced liver is otherwise unremarkable.    Pancreas demonstrates reduced T1 signal intensity. Moderate peripancreatic  edema extending into the retroperitoneum.    Adrenal glands have normal contours.    Kidneys have normal contours. No hydronephrosis.    Vascular structures are poorly characterized without intravenous contrast.   Impression: Impression:  1. Moderate intrahepatic and extra hepatic biliary ductal dilatation  without obstructing stone or stricture. Findings may represent  postcholecystectomy biliary ectasia.    2. Prior cholecystectomy.    3. Findings consistent with acute interstitial pancreatitis. No  peripancreatic fluid collection.    4. Transition from dilated pancreatic duct narrowed pancreatic duct at the  level of the pancreatic head. Pancreatic duct strictures in the  differential. Etiology is uncertain. Evaluation for underlying mass  severely degraded by lack of contrast and patient motion.         Assessment  1. 86 year old male with E coli bacteremia with fever which is due to biliary source with acute interstitial pancreatitis and pancreatic duct cholangitis   -in the setting of high grade  fever with rigors on 1/4 due to the bacteremia - fever curve improving since  -leukocytosis today is likely due to delayed immune response (WBC was wnl on arrival, and now elevated. But clinically improved)    2.  Acute interstitial edematous pancreatitis. Peripancreatic fluid noted and they could not exclude mass at the pancreatic head, but no evidence of fluid collection or cystic structure per report    3. New/increased biliary and pancreatic ductal dilatation. No obstructing lesion is identified.  But further workup with the MRCP shows that there appears to be a stricture of the pancreatic duct located at the pancreatic head.  No obvious stones were seen     Plan  -GI has consulted with [facility] Dr. Lee and ERCP is advised, scheduled now for tomorrow morning. He will be transported to [facility] tomorrow with plans to return to [facility] after procedure  -Patient to remain NPO  -continue on IV abx, agree with continuing Zosyn  -await the sensitivity panel on the E coli.  If it is sensitive to FQ then PO abx at discharge may be an option and plan 2 wks total  - continue to monitor temps and WBC closely    Patient s/e and staffed with Dr. Kelly who is in agreement with the above assessment and plan.         Thank you for the opportunity to participate in this patient's care. We will follow with you.    Amy Bailey PA-C  1/5/2023    ID Staff:  Labs, XRay and vitals personally reviewed  Relevant notes reviewed  Above discussed with PA and A/P created in collaboration     MANOHAR Kelly DO

## 2023-02-07 ENCOUNTER — NON-APPOINTMENT (OUTPATIENT)
Age: 58
End: 2023-02-07

## 2023-02-16 ENCOUNTER — APPOINTMENT (OUTPATIENT)
Dept: SURGERY | Facility: CLINIC | Age: 58
End: 2023-02-16
Payer: MEDICAID

## 2023-02-16 PROCEDURE — 36415 COLL VENOUS BLD VENIPUNCTURE: CPT

## 2023-02-17 ENCOUNTER — NON-APPOINTMENT (OUTPATIENT)
Age: 58
End: 2023-02-17

## 2023-02-17 LAB
T3 SERPL-MCNC: 82 NG/DL
T4 FREE SERPL-MCNC: 1.4 NG/DL
TSH SERPL-ACNC: 9.65 UIU/ML

## 2023-02-20 ENCOUNTER — NON-APPOINTMENT (OUTPATIENT)
Age: 58
End: 2023-02-20

## 2023-02-20 LAB
THYROGLOB AB SERPL-ACNC: <20 IU/ML
THYROPEROXIDASE AB SERPL IA-ACNC: 154 IU/ML

## 2023-04-24 NOTE — ED ADULT NURSE NOTE - PAIN: PRESENCE, MLM
H&P For Gastroenterology Procedure.     Procedure Date:  2023    Patient: Ben Robert  : 1984    Sedation: MAC    Outpatient History and Physical Review for EGD    Indications: Dysphagia, Odynophagia and Follow up esophagitis    Family History of Colon Cancer or Colon Polyps: No    Current Outpatient Medications   Medication Sig Dispense Refill   • omeprazole (PriLOSEC) 40 MG capsule Take 1 capsule by mouth in the morning and 1 capsule in the evening. Take before meals. 60 capsule 3   • sucralfate (CARAFATE) 1 GM/10ML suspension Take 10 mLs by mouth 4 times daily (before meals and nightly). 1200 mL 0     Current Facility-Administered Medications   Medication Dose Route Frequency Provider Last Rate Last Admin   • lactated ringers infusion   Intravenous Continuous Anya Warren CRNA           ALLERGIES:  Patient has no known allergies.            Past Medical History:   Diagnosis Date   • Heart murmur      Past Surgical History:   Procedure Laterality Date   • Mandible surgery           PHYSICAL EXAM:  HEENT: Within normal limits  LUNGS: Lungs clear to auscultation. No cold symptoms present.  HEART: S1,S2, regular rate and rhythm, no murmer.  NEUROLOGICAL: Within normal limits.  MENTAL STATUS: Alert, oriented x3, interactive.  SKIN: Warm, dry and intact, no lesions or rashes.   GENITOURINARY: N\A    Mallampati Score: 2    ASA Classification: 1    The risks of the procedure including the possibility of bleeding, perforation (possibly resulting in laparotomy/colostomy) aspiration, and the risk of a polypectomy were discussed with the patient who accepts these risks.                
complains of pain/discomfort

## 2023-05-16 ENCOUNTER — APPOINTMENT (OUTPATIENT)
Dept: SURGERY | Facility: CLINIC | Age: 58
End: 2023-05-16

## 2023-06-26 ENCOUNTER — RX RENEWAL (OUTPATIENT)
Age: 58
End: 2023-06-26

## 2023-06-26 RX ORDER — LEVOTHYROXINE SODIUM 0.11 MG/1
112 TABLET ORAL DAILY
Qty: 90 | Refills: 4 | Status: ACTIVE | COMMUNITY
Start: 2023-02-17 | End: 1900-01-01
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
## 2023-07-11 ENCOUNTER — APPOINTMENT (OUTPATIENT)
Dept: SURGERY | Facility: CLINIC | Age: 58
End: 2023-07-11

## 2023-08-16 NOTE — SURGICAL HISTORY
br
He was initially seen 6/28/2023br Pleasant 42-year-old self-referral.  He reports episode about a year ago with atypical chest pain and heartburn. He went to the ER in [place] and they didn't work up that he reports is negative. He was under a lot of stress at that time and he thought he probably had a panic attack. Over the last year he's had some continued intermittent symptoms of heartburn and globus and neck pain. The heartburn is random. He also has some pain in his shoulder that radiates to his neck that flares every 6-8 weeks in the last 1-2 days that he seems to associate with heartburn symptoms. He occasionally has some upper abdominal gurgling and discomfort. He discontinue coffee and wine 4-5 months ago. One week ago he did drink decaf coffee for several days and had a flare of his symptoms. He does not take heartburn medicine. He is taken Tums 5 or 6 times. He denies dysphagia, nausea, vomiting, weight loss, diarrhea, constipation, signs of GI bleeding.brPlanbr- CMP (Complete Metabolic Panel)br- CBC w/auto diffbr- [id number]- Patient Education risks and benefits discussedbr- omeprazole 40 mg daily
br
br Interval history, 8/16/2023
br 
His symptoms completely resolved with Omeprazole 40 mg daily.  He is doing well.  He denies any current heartburn, upper abdominal pain, chest pain or neck pain. br
br
br Labsbr visited="true"  6/2023- WBC 11.2, hemoglobin 14.9, platelet 225, CMP normal
[Cholecystectomy] : an ~L expanded cortex

## 2023-09-24 NOTE — H&P PST ADULT - ACTIVITY
Effective mastication of chewable solids. Clear oral cavity. Effective mastication of chewable solids, however increased WOB after mastication. Clear oral cavity. walks, ADLs.can climb 1 flight with no chest pain or dyspnea walks, ADLs. can climb 1 flight with no chest pain or dyspnea

## 2023-10-30 NOTE — ED ADULT TRIAGE NOTE - PRO INTERPRETER NEED 2
Assessment & Plan     (F90.2) Attention deficit hyperactivity disorder (ADHD), combined type  Comment: Prakash continues to do well with current medication of Adderall XR 15 mg. He has struggled recently with increased academic and social expectations. Prakash is interested in increasing Adderall dose. Will increase dose to 20 mg. Potential side effects discussed. Prakash should follow-up virtually or in-person in 1 month or sooner with concerns. Mother and Prakash agree with plan.  Plan: amphetamine-dextroamphetamine (ADDERALL XR) 20 MG 24 hr capsule    (F32.A) Depression, unspecified depression type, (F41.1) FREDERIC (generalized anxiety disorder)  Comment: FREDERIC-7 and PHQ-9 scores are stable. Will continue current fluoxetine dose. Recommend continued consideration of therapy. Depression/anxiety follow-up in 6 months or sooner with concerns.  Plan: FLUoxetine (PROZAC) 10 MG tablet    (F84.0) Autism spectrum disorder  Comment: Has IEP. Doing well currently. Mother declines additional resources today.    Follow-up: in ~1 month or sooner with concerns.    MARCIA Fan CNP        Subjective   Prakash is a 12 year old, presenting for the following health issues:  Well Child and Recheck Medication        10/30/2023     8:25 AM   Additional Questions   Roomed by Loni Harvey CMA   Accompanied by Mom       HPI       Mental Health Follow-up Visit for Depression.   How is your mood today? Good, Tired.   Change in symptoms since last visit: same  New symptoms since last visit:  None   Problems taking medications: No  Who else is on your mental health care team?  Not currently.   Has tried therapy 3 times but did not feel it was beneficial. Is not interested at this time.    +++++++++++++++++++++++++++++++++++++++++++++++++++++++++++++++        8/16/2022     6:56 AM 4/3/2023     7:00 AM 10/30/2023     8:23 AM   PHQ   PHQ-A Total Score 3 8 9   PHQ-A Depressed most days in past year Yes Yes No   PHQ-A Mood affect on daily  activities Not difficult at all Not difficult at all Somewhat difficult   PHQ-A Suicide Ideation past 2 weeks Not at all Not at all Not at all   PHQ-A Suicide Ideation past month No No No   PHQ-A Previous suicide attempt No No No         12/3/2019     8:44 AM 4/3/2023     7:22 AM 6/8/2023    11:02 AM   FREDERIC-7 SCORE   Total Score  7 (mild anxiety) 5 (mild anxiety)   Total Score 2 7 5       Home and School   Have there been any big changes at home? No  Are you having challenges at school?  Yes, difficult classes, struggles with making social connections with peers.   Social Supports:   mother  Sleep:  Hours of sleep on a school night: 8-10 hours  Substance abuse:  None  Maladaptive coping strategies:  None  Other Stressors : none      ADHD Follow-Up    Date of last ADHD office visit: 4/3/2023  Status since last visit: Stable, just having a hard time with more complex classes.   Taking controlled (daily) medications as prescribed: Yes                       Parent/Patient Concerns with Medications: Feels like he might benefit from an increase in the medication. Patient has concerns about increase.   ADHD Medication       Amphetamines Disp Start End     ADDERALL XR 15 MG 24 hr capsule    30 capsule 10/12/2023     Sig - Route: TAKE ONE CAPSULE BY MOUTH ONCE DAILY - Oral    Class: E-Prescribe    Earliest Fill Date: 10/12/2023     Prakash continues to take Adderall XR 15 mg. Family has noticed increased difficulty focusing since previous visit. He feels his classes are getting more difficult. Recently started working with a .     School:  Name of  : [employer]   Grade: 8th   School Concerns/Teacher Feedback: None  School services/Modifications: has IEP  Homework: Worse - is struggling academically. Recently started with a .   Grades: Stable    Sleep: no problems  Home/Family Concerns: None  Peer Concerns: social concerns.    Co-Morbid Diagnosis: Depression, Anxiety, and Autism    Currently in counseling: No, not interested  "at this time.    Medication Benefits:   Controlled symptoms: Hyperactivity - motor restlessness, Attention span, Distractability, Finishing tasks, Impulse control, Frustration tolerance, Accepting limits, and School failure  Uncontrolled Symptoms : None (family wonders if Prakash would benefit from increased dose)    Medication side effects:  Side effects noted: none  Denies: appetite suppression, weight loss, insomnia, tics, palpitations, stomach ache, headache, emotional lability, rebound irritability, drowsiness, \"zombie\" effect, growth suppression, and dry mouth    Review of Systems   Constitutional, eye, ENT, skin, respiratory, cardiac, and GI are normal except as otherwise noted.      Objective    /69   Pulse 94   Temp 97.8  F (36.6  C) (Tympanic)   Resp 20   Ht 5' 1.75\" (1.568 m)   Wt 167 lb 12.8 oz (76.1 kg)   SpO2 99%   BMI 30.94 kg/m    97 %ile (Z= 1.96) based on [facility] (Boys, 2-20 Years) weight-for-age data using vitals from 10/30/2023.  Blood pressure reading is in the elevated blood pressure range (BP >= 120/80) based on the 2017 AAP Clinical Practice Guideline.    Physical Exam   GENERAL:  Alert and interactive., EYES:  Normal extra-ocular movements.  PERRLA, LUNGS:  Clear, HEART:  Normal rate and rhythm.  Normal S1 and S2.  No murmurs., NEURO:  No tics or tremor.  Normal tone and strength. Normal gait and balance. , and MENTAL HEALTH: Mood and affect are neutral. There is good eye contact with the examiner.  Patient appears relaxed and well groomed.  No psychomotor agitation or retardation.  Thought content seems intact and some insight is demonstrated.  Speech is unpressured.    Diagnostics : None      " [facility]

## 2024-03-18 NOTE — ASU PREOP CHECKLIST - BP NONINVASIVE SYSTOLIC (MM HG)
Patient realized that the price at retail pharmacy is way more than the mail order pharmacy. Please send again to the mail order pharmacy   137

## 2024-06-10 ENCOUNTER — APPOINTMENT (OUTPATIENT)
Dept: PULMONOLOGY | Facility: CLINIC | Age: 59
End: 2024-06-10

## 2024-07-30 ENCOUNTER — TRANSCRIPTION ENCOUNTER (OUTPATIENT)
Age: 59
End: 2024-07-30

## 2024-08-29 ENCOUNTER — APPOINTMENT (OUTPATIENT)
Dept: PULMONOLOGY | Facility: CLINIC | Age: 59
End: 2024-08-29
Payer: MEDICAID

## 2024-08-29 VITALS
TEMPERATURE: 98.1 F | BODY MASS INDEX: 33.18 KG/M2 | HEART RATE: 105 BPM | SYSTOLIC BLOOD PRESSURE: 123 MMHG | WEIGHT: 169 LBS | DIASTOLIC BLOOD PRESSURE: 83 MMHG | HEIGHT: 60 IN | OXYGEN SATURATION: 99 %

## 2024-08-29 DIAGNOSIS — J44.9 CHRONIC OBSTRUCTIVE PULMONARY DISEASE, UNSPECIFIED: ICD-10-CM

## 2024-08-29 DIAGNOSIS — Z86.39 PERSONAL HISTORY OF OTHER ENDOCRINE, NUTRITIONAL AND METABOLIC DISEASE: ICD-10-CM

## 2024-08-29 DIAGNOSIS — Z86.79 PERSONAL HISTORY OF OTHER DISEASES OF THE CIRCULATORY SYSTEM: ICD-10-CM

## 2024-08-29 DIAGNOSIS — Z87.09 PERSONAL HISTORY OF OTHER DISEASES OF THE RESPIRATORY SYSTEM: ICD-10-CM

## 2024-08-29 DIAGNOSIS — Z86.69 PERSONAL HISTORY OF OTHER DISEASES OF THE NERVOUS SYSTEM AND SENSE ORGANS: ICD-10-CM

## 2024-08-29 PROCEDURE — 71046 X-RAY EXAM CHEST 2 VIEWS: CPT

## 2024-08-29 PROCEDURE — 99204 OFFICE O/P NEW MOD 45 MIN: CPT | Mod: 25

## 2024-08-29 PROCEDURE — G2211 COMPLEX E/M VISIT ADD ON: CPT | Mod: NC

## 2024-09-01 PROBLEM — J44.9 CHRONIC OBSTRUCTIVE PULMONARY DISEASE, UNSPECIFIED COPD TYPE: Status: ACTIVE | Noted: 2024-09-01

## 2024-09-01 NOTE — HISTORY OF PRESENT ILLNESS
[Never] : never [TextBox_4] : DESIRAE NÚÑEZ is a 59 year old female who presents with family for f/u on asthma/COPD PMH DM, HTN, HLD, thyroid surgery, chronic upper airway wheeze/ throat clearing , never smoker has exposure to second hand smoke wiht   on trelegy has BEBA diagnosis on PAP with doctors in [facility]  wants to establish care closer t home [TextBox_29] : second hand exposure

## 2024-09-01 NOTE — PHYSICAL EXAM
[No Acute Distress] : no acute distress [No Deformities] : no deformities [IV] : Mallampati Class: IV [No Resp Distress] : no resp distress [No Acc Muscle Use] : no acc muscle use [Clear to Auscultation Bilaterally] : clear to auscultation bilaterally [No Focal Deficits] : no focal deficits [No Motor Deficits] : no motor deficits

## 2024-09-01 NOTE — PROCEDURE
[FreeTextEntry1] : PA and lateral chest xray performed using standard projections. Bones and soft tissues structures are unremarkable.Cardiac silhouette appears normal. Lung fields are clear. No infiltrate or masses noted. Mediastinal contours are normal. elevate right genie IMPRESSION: elevated right hemidiagraphm

## 2024-09-06 ENCOUNTER — NON-APPOINTMENT (OUTPATIENT)
Age: 59
End: 2024-09-06

## 2024-09-24 ENCOUNTER — APPOINTMENT (OUTPATIENT)
Dept: PULMONOLOGY | Facility: CLINIC | Age: 59
End: 2024-09-24
Payer: MEDICAID

## 2024-09-24 VITALS
TEMPERATURE: 98.3 F | SYSTOLIC BLOOD PRESSURE: 120 MMHG | HEIGHT: 60 IN | DIASTOLIC BLOOD PRESSURE: 80 MMHG | OXYGEN SATURATION: 95 % | WEIGHT: 169 LBS | HEART RATE: 95 BPM | BODY MASS INDEX: 33.18 KG/M2

## 2024-09-24 DIAGNOSIS — G47.33 OBSTRUCTIVE SLEEP APNEA (ADULT) (PEDIATRIC): ICD-10-CM

## 2024-09-24 DIAGNOSIS — J44.9 CHRONIC OBSTRUCTIVE PULMONARY DISEASE, UNSPECIFIED: ICD-10-CM

## 2024-09-24 DIAGNOSIS — E04.1 NONTOXIC SINGLE THYROID NODULE: ICD-10-CM

## 2024-09-24 PROCEDURE — ZZZZZ: CPT

## 2024-09-24 PROCEDURE — 94727 GAS DIL/WSHOT DETER LNG VOL: CPT

## 2024-09-24 PROCEDURE — 99214 OFFICE O/P EST MOD 30 MIN: CPT | Mod: 25

## 2024-09-24 PROCEDURE — 94010 BREATHING CAPACITY TEST: CPT

## 2024-09-24 PROCEDURE — 94729 DIFFUSING CAPACITY: CPT

## 2024-09-25 PROBLEM — G47.33 OBSTRUCTIVE SLEEP APNEA SYNDROME: Status: ACTIVE | Noted: 2024-08-29

## 2024-09-25 NOTE — HISTORY OF PRESENT ILLNESS
[Never] : never [TextBox_4] : DESIRAE NÚÑEZ is a 59 year old female who presents with son for f/u on asthma/COPD, PFTS and CT results  PMH DM, HTN, HLD, thyroid surgery, chronic upper airway wheeze/ throat clearing , never smoker has exposure to second hand smoke wiht   on trelegy has BEBA diagnosis on PAP with doctors in [facility]   remains on trelegy change in [place]  did pfts with us   [TextBox_29] : second hand exposure

## 2024-09-25 NOTE — PHYSICAL EXAM
[No Acute Distress] : no acute distress [Well Nourished] : well nourished [No Deformities] : no deformities [IV] : Mallampati Class: IV [No Resp Distress] : no resp distress [No Acc Muscle Use] : no acc muscle use [Clear to Auscultation Bilaterally] : clear to auscultation bilaterally [No Focal Deficits] : no focal deficits [No Motor Deficits] : no motor deficits

## 2024-09-25 NOTE — PROCEDURE
[FreeTextEntry1] : CT chest lHR- granulmoas, nodules pfts normal   previous data reviewed:  PA and lateral chest xray performed using standard projections. Bones and soft tissues structures are unremarkable.Cardiac silhouette appears normal. Lung fields are clear. No infiltrate or masses noted. Mediastinal contours are normal. elevate right genie IMPRESSION: elevated right hemidiagraphm

## 2024-09-30 PROBLEM — N60.01 CYST OF RIGHT BREAST: Status: ACTIVE | Noted: 2024-09-30

## 2024-09-30 PROBLEM — R92.8 ABNORMAL FINDING ON BREAST IMAGING: Status: ACTIVE | Noted: 2024-09-30

## 2024-09-30 NOTE — REVIEW OF SYSTEMS
HISTORY OF PRESENT ILLNESS  Robles Montanez is a 61 y.o. female. HPI ESTABLISHED patient here for LEFT breast mass biopsy. 2 months ago she noticed pain (4/10) and swelling in the upper breast.  Mammogram and ultrasound are BIRADS 5. Denies skin changes. 7/11 LEFT lumpectomy, 2.5cm grade 3 Invasive ductal carcinoma/DCIS, SN- (2) %. %, Her 2 -. Oncotype 46, high risk Chemotherapy and radiation therapy. Arimidex    Mammogram done 1/13/17, BIRADS 5  Targeted sonographic evaluation of the upper outer quadrant of the right breast  is performed. Within the upper outer quadrant of the left breast, there is a 3.4  cm x 4.7 cm x 3.4 cm shadowing mass with irregular peripheral margins and  containing microcalcifications. ROS    Physical Exam   Pulmonary/Chest: Right breast exhibits no inverted nipple, no mass, no nipple discharge, no skin change and no tenderness. Left breast exhibits mass (dense area UOQ.  no skin retraction associated with the mass) and skin change (skin retraction at lumpectomy site LOQ). Left breast exhibits no inverted nipple, no nipple discharge and no tenderness. Breasts are asymmetrical (LEFT breast is smaller after lumpectomy and radiation). Lymphadenopathy:     She has no cervical adenopathy. She has no axillary adenopathy. Right: No supraclavicular adenopathy present. Left: No supraclavicular adenopathy present. US - Guided Core Biopsy  Indication : Left Breast mass upper outer quadrant. Dense area. Ultrasound Findings:  3.4  cm x 4.7 cm x 3.4 cm shadowing mass with irregular peripheral margins and  containing microcalcifications. Prep : alcohol. Anesthesia : 1% lidocaine with epinephrine, 10 cc. Device : The hand-held 10 gauge BARD needle was inserted through the lesion and captured tissue with real-time Ultrasound Confirmation. .   Core Sampling :  2 cores were obtained. Marker: clip placed   Dressing : Steristrips, gauze and tape. Instructions : Remove gauze this evening. Remove steristrips in one week. Tolerance: Pt tolerated procedure. The first core was painless. The second core resulted in stinging in the breast.  Pathology :  Breast, left, core biopsy:       Invasive ductal carcinoma, favor grade 3  Concordance: yes  ASSESSMENT and PLAN    ICD-10-CM ICD-9-CM    1. Breast mass, left N63 611.72 SURGICAL PATHOLOGY     If the biopsy is positive she should have a mastectomy. We talked about this and she agrees. Receptors pending. Will arrange med onc appt with Dr Blanche Elder. [Negative] : Heme/Lymph

## 2024-10-04 ENCOUNTER — APPOINTMENT (OUTPATIENT)
Dept: SURGICAL ONCOLOGY | Facility: CLINIC | Age: 59
End: 2024-10-04
Payer: MEDICAID

## 2024-10-04 VITALS
WEIGHT: 175 LBS | TEMPERATURE: 97.7 F | BODY MASS INDEX: 34.18 KG/M2 | SYSTOLIC BLOOD PRESSURE: 134 MMHG | OXYGEN SATURATION: 95 % | HEART RATE: 96 BPM | RESPIRATION RATE: 18 BRPM | DIASTOLIC BLOOD PRESSURE: 85 MMHG

## 2024-10-04 DIAGNOSIS — N60.01 SOLITARY CYST OF RIGHT BREAST: ICD-10-CM

## 2024-10-04 DIAGNOSIS — Z78.9 OTHER SPECIFIED HEALTH STATUS: ICD-10-CM

## 2024-10-04 DIAGNOSIS — R92.8 OTHER ABNORMAL AND INCONCLUSIVE FINDINGS ON DIAGNOSTIC IMAGING OF BREAST: ICD-10-CM

## 2024-10-04 PROCEDURE — 99204 OFFICE O/P NEW MOD 45 MIN: CPT

## 2024-10-04 NOTE — CONSULT LETTER
[Dear  ___] : Dear  [unfilled], [Consult Letter:] : I had the pleasure of evaluating your patient, [unfilled]. [Please see my note below.] : Please see my note below. [Consult Closing:] : Thank you very much for allowing me to participate in the care of this patient.  If you have any questions, please do not hesitate to contact me. [Sincerely,] : Sincerely, [FreeTextEntry2] : Dick Marion MD [FreeTextEntry3] : Suma Ray MD Breast Surgeon Division of Surgical Oncology Department of Surgery [address] [place] [address] Tel: [phone] Fax: [phone] Email: [email or web address]

## 2024-10-04 NOTE — HISTORY OF PRESENT ILLNESS
[FreeTextEntry1] : Ms. DESIRAE NÚÑEZ is a 59-year-old woman, referred by Dr. Dick Marion for consultation regarding a R breast cyst, here for an initial visit.  She is accompanied by her son Lisa Gonzales who assisted with translation to [facility] where needed. The patient is able to communicate in English.  The patient reports that she started yearly mammography age 39. She has not had any breast biopsies. She reports noticing a change to her left nipple approximately March or April earlier this year--described as "white" but not a mass, and not discharge. She is unable to elaborate further. She reports that this has since resolved. Denies any breast masses, skin changes, or nipple discharge at this time. The son reports that there is a mass on her back for which her doctor is concerned could be a sarcoma--they have been recommended to see a specialist to followup.  Recent Imaging: 10/10/2023 B/L SM (R) revealed SFGD - R/L neg - BR1  10/10/2023 B/L US (R) - L neg - R 12:00 N3, 4 mm complicated cyst/oval hypoechoic nodule, probably benign -> f/u R US in 6 months - BR3  2023 R US (NRAD) - R 12:00 N3, 4 mm hypoechoic mass, c/w a cyst containing debris, probably benign -> f/u US in 6 months - BR3  2024 R US (NRAD) - R 12:00 N3, 4 mm avascular, probable minimally complicated cyst cluster, stable & probably benign -> f/u R US in 3 months w/ annual imaging - BR3  PMH:  COPD, DM, HTN, HLD, pancreatitis, BEBA, asthma, AMD PSH:  CCY, thyroid lobectomy, C6-7 spinal fusion Meds:  Levothyroxine, cyclobenzaprine, pantoprazole, eye drops, albuterol, baclofen, Trelegy, metformin, rosuvastatin, Creon, gabapentin, montelukast, amlodipine ALL:  NSAIDs SH:  secondhand smoke from , never smoker, no EtOH FH: No breast cancer. Mother lung cancer (nonsmoker) GYN: Menarche 14, Menopause 36. , 1 MC. Age of first full-term pregnancy 15. Breast-feeding 5 years. OCP none. Fertility none. HRT none

## 2024-10-04 NOTE — PHYSICAL EXAM
[Normocephalic] : normocephalic [Atraumatic] : atraumatic [Supple] : supple [No Supraclavicular Adenopathy] : no supraclavicular adenopathy [Examined in the supine and seated position] : examined in the supine and seated position [No dominant masses] : no dominant masses in right breast  [No dominant masses] : no dominant masses left breast [No Nipple Retraction] : no left nipple retraction [No Nipple Discharge] : no left nipple discharge [No Axillary Lymphadenopathy] : no left axillary lymphadenopathy [No Edema] : no edema [No Rashes] : no rashes [No Ulceration] : no ulceration [EOMI] : extra ocular movement intact [PERRL] : pupils equal, round and reactive to light [Sclera nonicteric] : sclera nonicteric [Bra Size: ___] : Bra Size: [unfilled] [Grade 2] : Ptosis Grade 2 [Breast Mass Right Breast ___cm] : no masses [Breast Mass Left Breast ___cm] : no masses [Breast Nipple Inversion] : nipples not inverted [Breast Nipple Retraction] : nipples not retracted [Breast Nipple Flattening] : nipples not flattened [Breast Nipple Fissures] : nipples not fissured [Breast Abnormal Lactation (Galactorrhea)] : no galactorrhea [Breast Abnormal Secretion Bloody Fluid] : no bloody discharge [Breast Abnormal Secretion Serous Fluid] : no serous discharge [Breast Abnormal Secretion Opalescent Fluid] : no milky discharge [de-identified] : non-labored respirations  [de-identified] : no abnormality of nipple appreciated

## 2024-10-04 NOTE — HISTORY OF PRESENT ILLNESS
[FreeTextEntry1] : Ms. DESIRAE NÚÑEZ is a 59-year-old woman, referred by Dr. Dick Marion for consultation regarding a R breast cyst, here for an initial visit.  She is accompanied by her son Lisa Gonzales who assisted with translation to [facility] where needed. The patient is able to communicate in English.  The patient reports that she started yearly mammography age 39. She has not had any breast biopsies. She reports noticing a change to her left nipple approximately March or April earlier this year--described as "white" but not a mass, and not discharge. She is unable to elaborate further. She reports that this has since resolved. Denies any breast masses, skin changes, or nipple discharge at this time. The son reports that there is a mass on her back for which her doctor is concerned could be a sarcoma--they have been recommended to see a specialist to followup.  Recent Imaging: 10/10/2023 B/L SM (R) revealed SFGD - R/L neg - BR1  10/10/2023 B/L US (R) - L neg - R 12:00 N3, 4 mm complicated cyst/oval hypoechoic nodule, probably benign -> f/u R US in 6 months - BR3  2023 R US (NRAD) - R 12:00 N3, 4 mm hypoechoic mass, c/w a cyst containing debris, probably benign -> f/u US in 6 months - BR3  2024 R US (NRAD) - R 12:00 N3, 4 mm avascular, probable minimally complicated cyst cluster, stable & probably benign -> f/u R US in 3 months w/ annual imaging - BR3  PMH:  COPD, DM, HTN, HLD, pancreatitis, BEBA, asthma, AMD PSH:  CCY, thyroid lobectomy, C6-7 spinal fusion Meds:  Levothyroxine, cyclobenzaprine, pantoprazole, eye drops, albuterol, baclofen, Trelegy, metformin, rosuvastatin, Creon, gabapentin, montelukast, amlodipine ALL:  NSAIDs SH:  secondhand smoke from , never smoker, no EtOH FH: No breast cancer. Mother lung cancer (nonsmoker) GYN: Menarche 14, Menopause 36. , 1 MC. Age of first full-term pregnancy 15. Breast-feeding 5 years. OCP none. Fertility none. HRT none

## 2024-10-04 NOTE — CONSULT LETTER
[Dear  ___] : Dear  [unfilled], [Consult Letter:] : I had the pleasure of evaluating your patient, [unfilled]. [Please see my note below.] : Please see my note below. [Consult Closing:] : Thank you very much for allowing me to participate in the care of this patient.  If you have any questions, please do not hesitate to contact me. [Sincerely,] : Sincerely, [FreeTextEntry2] : Dick Marion MD [FreeTextEntry3] : Suma Ray MD Breast Surgeon Division of Surgical Oncology Department of Surgery [address] [place] [address] Tel: [phone] Fax: [phone] Email: [email or web address]

## 2024-10-04 NOTE — PHYSICAL EXAM
[Normocephalic] : normocephalic [Atraumatic] : atraumatic [Supple] : supple [No Supraclavicular Adenopathy] : no supraclavicular adenopathy [Examined in the supine and seated position] : examined in the supine and seated position [No dominant masses] : no dominant masses in right breast  [No dominant masses] : no dominant masses left breast [No Nipple Retraction] : no left nipple retraction [No Nipple Discharge] : no left nipple discharge [No Axillary Lymphadenopathy] : no left axillary lymphadenopathy [No Edema] : no edema [No Rashes] : no rashes [No Ulceration] : no ulceration [EOMI] : extra ocular movement intact [PERRL] : pupils equal, round and reactive to light [Sclera nonicteric] : sclera nonicteric [Bra Size: ___] : Bra Size: [unfilled] [Grade 2] : Ptosis Grade 2 [Breast Mass Right Breast ___cm] : no masses [Breast Mass Left Breast ___cm] : no masses [Breast Nipple Inversion] : nipples not inverted [Breast Nipple Retraction] : nipples not retracted [Breast Nipple Flattening] : nipples not flattened [Breast Nipple Fissures] : nipples not fissured [Breast Abnormal Lactation (Galactorrhea)] : no galactorrhea [Breast Abnormal Secretion Bloody Fluid] : no bloody discharge [Breast Abnormal Secretion Serous Fluid] : no serous discharge [Breast Abnormal Secretion Opalescent Fluid] : no milky discharge [de-identified] : non-labored respirations  [de-identified] : no abnormality of nipple appreciated

## 2024-10-04 NOTE — PAST MEDICAL HISTORY
[Postmenopausal] : The patient is postmenopausal [Menarche Age ____] : age at menarche was [unfilled] [Menopause Age____] : age at menopause was [unfilled] [History of Hormone Replacement Treatment] : has no history of hormone replacement treatment [Total Preg ___] : G[unfilled] [Live Births ___] : P[unfilled]  [FreeTextEntry6] : none [FreeTextEntry7] : none [FreeTextEntry8] : 5 years

## 2024-10-04 NOTE — ASSESSMENT
[FreeTextEntry1] : Ms. DESIRAE NÚÑEZ is a 59-year-old woman, referred by Dr. Dick Marion for consultation regarding a R breast cyst, here for an initial visit.  Breast cysts are fluid-filled round or ovoid masses. They can be found on either palpation or by imaging. A breast cyst is diagnosed by breast ultrasound, which also classifies it as simple, complicated, or complex. The sonographic appearance helps guide clinical management. Simple cysts, clustered simple microcysts, and most complicated cysts are benign and no intervention is needed.  Complicated cysts are rarely malignant, and can be followed every six months. Biopsy is indicated if the lesion increases in size or changes in characteristics on repeat imaging.  If complicated cysts completely collapse after FNA, this is considered benign. Complicated cysts that fail to completely collapse after FNA require further imaging, and CNB.  Complex cysts should be biopsied with CNB.  Surgical excision is indicated for complex cysts that are not amenable to CNB and when pathology results from a CNB are discordant, atypical, indeterminate, or reveal a malignancy.  PLAN: - B/L M/S now  - Return for f/u if continue surveillance recommended of Right breast cyst - Otherwise, can return to normal yearly imaging as directed by PCP - Recommend f/u with surgical oncologist for concern for back sarcoma

## 2024-10-15 ENCOUNTER — APPOINTMENT (OUTPATIENT)
Dept: SURGICAL ONCOLOGY | Facility: CLINIC | Age: 59
End: 2024-10-15

## 2024-12-24 ENCOUNTER — APPOINTMENT (OUTPATIENT)
Dept: PULMONOLOGY | Facility: CLINIC | Age: 59
End: 2024-12-24

## 2025-04-07 NOTE — ED PROVIDER NOTE - CARDIAC, MLM
Subjective   Stacy Monroy is a 89 y.o. female. Presents to [facility]    Chief Complaint   Patient presents with    Hypertension    Hyperlipidemia    Back Pain       Hyperlipidemia  This is a chronic problem. The current episode started more than 1 year ago. Exacerbating diseases include hypothyroidism and obesity. She has no history of diabetes. Pertinent negatives include no chest pain, leg pain or shortness of breath. She is currently on no antihyperlipidemic treatment. The current treatment provides mild improvement of lipids. There are no compliance problems.  Risk factors for coronary artery disease include dyslipidemia and hypertension.   Hypertension  This is a chronic problem. The current episode started more than 1 year ago. The problem is unchanged. The problem is controlled. Pertinent negatives include no anxiety, blurred vision, chest pain, headaches, malaise/fatigue, neck pain, palpitations, shortness of breath or sweats. There are no associated agents to hypertension. Risk factors for coronary artery disease include dyslipidemia and obesity. Past treatments include nothing. Current antihypertension treatment includes beta blockers. The current treatment provides mild improvement. There are no compliance problems.    Back Pain  This is a new problem. The current episode started in the past 7 days. The problem occurs intermittently. The problem has been comes and goes since onset. The pain is present in the lumbar spine and gluteal. The quality of the pain is described as shooting. The pain is at a severity of 8/10. The pain is moderate. The symptoms are aggravated by standing. Associated symptoms include abdominal pain (RUQ). Pertinent negatives include no bladder incontinence, bowel incontinence, chest pain, dysuria, fever, headaches, leg pain, numbness, pelvic pain, tingling or weakness. Treatments tried: has seen Dr. Hallman in the past for back pain.        I personally reviewed  and updated the patient's allergies, medications, problem list, and past medical, surgical, social, and family history. I have reviewed and confirmed the accuracy of the History of Present Illness and Review of Symptoms as documented by the MA/LPN/RN. Mita Moise MD    Allergies:  Allergies   Allergen Reactions    Azithromycin Rash     ALL mycin drugs    Erythromycin Rash     ALL mycins drugs    Penicillin G Rash    Penicillins Rash     ALL mycin drugs       Social History:  Social History     Socioeconomic History    Marital status:    Tobacco Use    Smoking status: Never     Passive exposure: Never    Smokeless tobacco: Never    Tobacco comments:     None   Vaping Use    Vaping status: Never Used   Substance and Sexual Activity    Alcohol use: No    Drug use: No    Sexual activity: Not Currently       Family History:  Family History   Problem Relation Age of Onset    Hypertension Mother     Stroke Mother     Hypertension Father     Stroke Father     Breast cancer Sister 50    Ovarian cancer Daughter 57    Breast cancer Niece 53       Past Medical History :  Patient Active Problem List   Diagnosis    Status post placement of implantable loop recorder    Essential hypertension    Hypothyroidism (acquired)    Acute seasonal allergic rhinitis due to pollen    Atherosclerosis    B12 deficiency    Bilateral carpal tunnel syndrome    Cataracts, bilateral    DNR (do not resuscitate)    Heart murmur    History of chicken pox    Menopausal syndrome    Mixed hyperlipidemia    Pernicious anemia    Influenza vaccine refused    Liver function abnormality    Other fatigue    RUQ abdominal mass    Cardiac arrhythmia    Deleon-Wasserman syncope    Sinus node dysfunction    SSS (sick sinus syndrome)    Acute gastric ulcer without hemorrhage or perforation    Presence of cardiac pacemaker    Intraductal papillary mucinous neoplasm of pancreas    Personal history of other infectious and parasitic diseases    Presence of  intraocular lens    Puckering of macula, right eye    Nonexudative age-related macular degeneration    Posterior vitreous detachment    Mammogram declined    Microhematuria    Iron deficiency anemia secondary to inadequate dietary iron intake    Chronic bilateral low back pain with right-sided sciatica    CRF (chronic renal failure), stage 2 (mild)    Left lower quadrant abdominal pain    Diarrhea    Cyst of pancreas    Right arm pain    Tear of biceps muscle, initial encounter    Rib pain on left side    Transaminitis    Cholangitis    Right ear impacted cerumen    Acute bilateral thoracic back pain       Medication List:    Current Outpatient Medications:     coenzyme Q10 100 MG capsule, Take 1 capsule by mouth Daily., Disp: , Rfl:     levothyroxine (SYNTHROID, LEVOTHROID) 50 MCG tablet, Take 1 tablet by mouth Daily., Disp: 30 tablet, Rfl: 12    metoprolol succinate XL (TOPROL-XL) 25 MG 24 hr tablet, TAKE 1 TABLET BY MOUTH EVERY DAY, Disp: 90 tablet, Rfl: 3    Omega-3 1000 MG capsule, Take 1 capsule by mouth Daily., Disp: , Rfl:     Past Surgical History:  Past Surgical History:   Procedure Laterality Date    BLADDER REPAIR      BREAST BIOPSY Right     CARDIAC ELECTROPHYSIOLOGY PROCEDURE N/A 05/19/2021    Procedure: Pacemaker DC new;  Surgeon: Edmar Rubin MD;  Location: [facility] CATH INVASIVE LOCATION;  Service: Cardiovascular;  Laterality: N/A;    ERCP N/A 09/14/2021    Procedure: ERCP WITH SPHINCTEROTOMY, sphincteroplasty, clearance of bile duct with balloon. brushing, placement of pancreatic stent and placement of metal biliary stent;  Surgeon: Adrienne Wilson MD;  Location: [facility] ENDOSCOPY;  Service: Gastroenterology;  Laterality: N/A;  post op: cystic mass compressing bile duct    ERCP N/A 3/8/2024    Procedure: ENDOSCOPIC RETROGRADE CHOLANGIOPANCREATOGRAPHY WITH BALLOON CLEARANCE OF CLOGGED METAL BILIARY STENT, BALLOON STONE EXTRACTION;  Surgeon: Samy Bryant MD;  Location:   "[facility];  Service: Gastroenterology;  Laterality: N/A;  POST:    HYSTERECTOMY      INSERT / REPLACE / REMOVE PACEMAKER      OTHER SURGICAL HISTORY  2019    Tilt table     OTHER SURGICAL HISTORY  2019    tilt table          Physical Exam:      Vital Signs:    Vitals:    04/14/25 1413   BP: 123/63   Pulse:    Resp:    Temp:    SpO2:         /63 Comment: at home  Pulse 74   Temp 96.9 °F (36.1 °C) (Temporal)   Resp 18   Ht 157 cm (61.81\")   Wt 60.1 kg (132 lb 6.4 oz)   LMP  (LMP Unknown)   SpO2 95%   BMI 24.37 kg/m²     Wt Readings from Last 3 Encounters:   04/14/25 60.1 kg (132 lb 6.4 oz)   01/31/25 59 kg (130 lb)   01/03/25 59.5 kg (131 lb 3.2 oz)       Result Review :   The following data was reviewed by: Mita Moise MD on 04/14/2025:            Latest Reference Range & Units 04/04/25 08:53   Sodium 134 - 144 mmol/L 141   Potassium 3.5 - 5.2 mmol/L 4.8   Chloride 96 - 106 mmol/L 104   CO2 20 - 29 mmol/L 23   BUN 10 - 36 mg/dL 18   Creatinine 0.57 - 1.00 mg/dL 1.00   BUN/Creatinine Ratio 12 - 28  18   EGFR Result >59 mL/min/1.73 52 (L)   Glucose 70 - 99 mg/dL 93   Calcium 8.7 - 10.3 mg/dL 9.8   Alkaline Phosphatase 44 - 121 IU/L 76   Total Protein 6.0 - 8.5 g/dL 6.7   Albumin 3.6 - 4.6 g/dL 4.2   AST (SGOT) 0 - 40 IU/L 26   ALT (SGPT) 0 - 32 IU/L 16   Total Bilirubin 0.0 - 1.2 mg/dL 0.7   TSH Baseline 0.450 - 4.500 uIU/mL 4.420   Vitamin B-12 232 - 1,245 pg/mL 588   Total Cholesterol 100 - 199 mg/dL 216 (H)   HDL Cholesterol >39 mg/dL 63   LDL Cholesterol  0 - 99 mg/dL 139 (H)   Triglycerides 0 - 149 mg/dL 77   Chol/HDL Ratio 0.0 - 4.4 ratio 3.4   VLDL Cholesterol Emerson 5 - 40 mg/dL 14   Globulin 1.5 - 4.5 g/dL 2.5   WBC 3.4 - 10.8 x10E3/uL 8.0   RBC 3.77 - 5.28 x10E6/uL 4.86   Hemoglobin 11.1 - 15.9 g/dL 12.1   Hematocrit 34.0 - 46.6 % 38.9   Platelets 150 - 450 x10E3/uL 297   RDW 11.7 - 15.4 % 16.8 (H)   MCV 79 - 97 fL 80   MCH 26.6 - 33.0 pg 24.9 (L)   MCHC 31.5 - 35.7 g/dL 31.1 (L) "   Neutrophil Rel % Not Estab. % 50   Lymphocyte Rel % Not Estab. % 37   Monocyte Rel % Not Estab. % 10   Eosinophil Rel % Not Estab. % 2   Basophil Rel % Not Estab. % 1   Immature Granulocyte Rel % Not Estab. % 0   Neutrophils Absolute 1.4 - 7.0 x10E3/uL 4.1   Lymphocytes Absolute 0.7 - 3.1 x10E3/uL 3.0   Monocytes Absolute 0.1 - 0.9 x10E3/uL 0.8   Eosinophils Absolute 0.0 - 0.4 x10E3/uL 0.2   Basophils Absolute 0.0 - 0.2 x10E3/uL 0.1   Immature Grans, Absolute 0.0 - 0.1 x10E3/uL 0.0   (L): Data is abnormally low  (H): Data is abnormally high    Physical Exam  Vitals reviewed.   Constitutional:       Appearance: Normal appearance. She is well-developed.   HENT:      Head: Normocephalic and atraumatic.   Eyes:      General:         Right eye: No discharge.         Left eye: No discharge.   Cardiovascular:      Rate and Rhythm: Normal rate and regular rhythm.      Heart sounds: Normal heart sounds. No murmur heard.     No friction rub. No gallop.   Pulmonary:      Effort: Pulmonary effort is normal. No respiratory distress.      Breath sounds: Normal breath sounds. No wheezing or rales.   Musculoskeletal:      Thoracic back: No swelling, edema, deformity, signs of trauma, lacerations, spasms, tenderness or bony tenderness. Normal range of motion. No scoliosis.   Skin:     General: Skin is warm and dry.      Findings: No rash.   Neurological:      Mental Status: She is alert and oriented to person, place, and time.      Coordination: Coordination normal.      Gait: Gait normal.   Psychiatric:         Behavior: Behavior is cooperative.         Assessment and Plan:  Problems Addressed this Visit          Cardiac and Vasculature    Essential hypertension    Hypertension is stable and controlled  Continue current treatment regimen.  Dietary sodium restriction.  Blood pressure will be reassessed in 3 months         Mixed hyperlipidemia     Lipid abnormalities are stable    Plan:  Lipid lowering therapy not prescribed due to  diet controlled    Counseled patient on lifestyle modifications to help control hyperlipidemia.     Patient Treatment Goals:   LDL goal is under 160    Followup in 1 year.            Musculoskeletal and Injuries    Acute bilateral thoracic back pain - Primary    No tenderness on exam. Will checkxray  Ice three times a day for about 10-15 minutes for the first 1-2 days. Then may alternate heat and ice. Better body mechanics discussed. Home exercises discussed and hand out given. Discussed nsaids and if they can be taken. May need imaging and or PT if persists.  Discussed red flags, if there is severe pain, fever with pain, loss of movement in one or both legs pain, numbness in groin or both legs, trouble urinating or defecating on oneself, then patient is to go to the ER.            Relevant Orders    XR Spine Thoracic 3 View     Diagnoses         Codes Comments      Acute bilateral thoracic back pain    -  Primary ICD-10-CM: M54.6  ICD-9-CM: 724.1       Mixed hyperlipidemia     ICD-10-CM: E78.2  ICD-9-CM: 272.2       Essential hypertension     ICD-10-CM: I10  ICD-9-CM: 401.9              BMI is within normal parameters. No other follow-up for BMI required.           An After Visit Summary and PPPS were given to the patient.       This document is intended for medical expert use only. Reading of this document by patients and/or patient's family without participating medical staff guidance may result in misinterpretation and unintended morbidity. Any interpretation of such data is the responsibility of the patient and/or family member responsible for the patient in concert with their primary or specialist providers, not to be left for sources of online searches such as Frequent Browser, Pretty in my Pocket (PRIMP) or similar queries. Relying on these approaches to knowledge may result in misinterpretation, misguided goals of care and even death should patients or family members try recommendations outside of the realm of professional medical care.  Normal rate, regular rhythm.  Heart sounds S1, S2.  No murmurs, rubs or gallops.

## 2025-09-19 ENCOUNTER — TRANSCRIPTION ENCOUNTER (OUTPATIENT)
Age: 60
End: 2025-09-19

## (undated) DEVICE — CANISTER DISPOSABLE THIN WALL 3000CC

## (undated) DEVICE — VENODYNE/SCD SLEEVE CALF MEDIUM

## (undated) DEVICE — SOL IRR POUR H2O 1500ML

## (undated) DEVICE — SOL IRR POUR H2O 500ML

## (undated) DEVICE — DRSG BENZOIN 0.6CC

## (undated) DEVICE — APPLICATOR Q TIP 6" WOOD STEM

## (undated) DEVICE — SUT PROLENE 0 30" CT-1

## (undated) DEVICE — PACK HEAD & NECK

## (undated) DEVICE — LABELS BLANK W PEN

## (undated) DEVICE — SUT SILK 2-0 18" FS

## (undated) DEVICE — BIPOLAR FORCEP KIRWAN CUSHING 6" X 1MM W 12FT CORD (GREEN)

## (undated) DEVICE — ELCTR GROUNDING PAD ADULT COVIDIEN

## (undated) DEVICE — DRAPE 3/4 SHEET 52X76"

## (undated) DEVICE — SAFETY PIN

## (undated) DEVICE — DRAPE LAPAROTOMY TRANSVERSE

## (undated) DEVICE — SUT CHROMIC 3-0 27" SH

## (undated) DEVICE — SUT VICRYL 3-0 18" TIES UNDYED

## (undated) DEVICE — LAP PAD W RING 18 X 18"

## (undated) DEVICE — SUT VICRYL 2-0 18" TIES UNDYED

## (undated) DEVICE — SUT MONOCRYL 4-0 27" PS-2 UNDYED

## (undated) DEVICE — SUT VICRYL 3-0 18" SH (POP-OFF)

## (undated) DEVICE — DRAIN RESERVOIR FOR JACKSON PRATT 100CC CARDINAL

## (undated) DEVICE — GLV 7 PROTEXIS (WHITE)

## (undated) DEVICE — SUT VICRYL 2-0 27" SH UNDYED

## (undated) DEVICE — DRAIN JACKSON PRATT 7MM FLAT FULL NO TROCAR

## (undated) DEVICE — ELCTR MONOPOLAR STIMULATOR PROBE FLUSH-TIP

## (undated) DEVICE — PROTECTOR HEEL / ELBOW FLUFFY